# Patient Record
Sex: MALE | Race: WHITE | Employment: OTHER | URBAN - METROPOLITAN AREA
[De-identification: names, ages, dates, MRNs, and addresses within clinical notes are randomized per-mention and may not be internally consistent; named-entity substitution may affect disease eponyms.]

---

## 2020-01-01 ENCOUNTER — APPOINTMENT (OUTPATIENT)
Dept: SPEECH THERAPY | Facility: CLINIC | Age: 85
DRG: 065 | End: 2020-01-01
Payer: MEDICARE

## 2020-01-01 ENCOUNTER — HOSPITAL ENCOUNTER (EMERGENCY)
Facility: HOSPITAL | Age: 85
Discharge: HOME | End: 2020-11-05
Attending: PHYSICIAN ASSISTANT | Admitting: PHYSICIAN ASSISTANT
Payer: COMMERCIAL

## 2020-01-01 ENCOUNTER — APPOINTMENT (OUTPATIENT)
Dept: GENERAL RADIOLOGY | Facility: HOSPITAL | Age: 85
End: 2020-01-01
Attending: PHYSICIAN ASSISTANT
Payer: COMMERCIAL

## 2020-01-01 ENCOUNTER — HOSPITAL ENCOUNTER (EMERGENCY)
Facility: HOSPITAL | Age: 85
Discharge: ANOTHER HOSPITAL | End: 2020-01-07
Attending: INTERNAL MEDICINE | Admitting: INTERNAL MEDICINE
Payer: MEDICARE

## 2020-01-01 ENCOUNTER — APPOINTMENT (OUTPATIENT)
Dept: CARDIOLOGY | Facility: CLINIC | Age: 85
DRG: 065 | End: 2020-01-01
Attending: STUDENT IN AN ORGANIZED HEALTH CARE EDUCATION/TRAINING PROGRAM
Payer: MEDICARE

## 2020-01-01 ENCOUNTER — APPOINTMENT (OUTPATIENT)
Dept: CT IMAGING | Facility: HOSPITAL | Age: 85
End: 2020-01-01
Attending: INTERNAL MEDICINE
Payer: MEDICARE

## 2020-01-01 ENCOUNTER — APPOINTMENT (OUTPATIENT)
Dept: INTERVENTIONAL RADIOLOGY/VASCULAR | Facility: CLINIC | Age: 85
DRG: 065 | End: 2020-01-01
Payer: MEDICARE

## 2020-01-01 ENCOUNTER — RESULTS ONLY (OUTPATIENT)
Dept: LAB | Age: 85
End: 2020-01-01

## 2020-01-01 ENCOUNTER — APPOINTMENT (OUTPATIENT)
Dept: CT IMAGING | Facility: CLINIC | Age: 85
DRG: 065 | End: 2020-01-01
Payer: MEDICARE

## 2020-01-01 ENCOUNTER — APPOINTMENT (OUTPATIENT)
Dept: PHYSICAL THERAPY | Facility: CLINIC | Age: 85
DRG: 065 | End: 2020-01-01
Attending: STUDENT IN AN ORGANIZED HEALTH CARE EDUCATION/TRAINING PROGRAM
Payer: MEDICARE

## 2020-01-01 ENCOUNTER — APPOINTMENT (OUTPATIENT)
Dept: CT IMAGING | Facility: HOSPITAL | Age: 85
End: 2020-01-01
Attending: PHYSICIAN ASSISTANT
Payer: COMMERCIAL

## 2020-01-01 ENCOUNTER — APPOINTMENT (OUTPATIENT)
Dept: MRI IMAGING | Facility: CLINIC | Age: 85
DRG: 065 | End: 2020-01-01
Payer: MEDICARE

## 2020-01-01 ENCOUNTER — HOSPITAL ENCOUNTER (INPATIENT)
Facility: CLINIC | Age: 85
LOS: 3 days | Discharge: NURSING, REHAB OR LONG-TERM CARE | DRG: 065 | End: 2020-01-10
Attending: EMERGENCY MEDICINE | Admitting: PSYCHIATRY & NEUROLOGY
Payer: MEDICARE

## 2020-01-01 ENCOUNTER — APPOINTMENT (OUTPATIENT)
Dept: OCCUPATIONAL THERAPY | Facility: CLINIC | Age: 85
DRG: 065 | End: 2020-01-01
Attending: STUDENT IN AN ORGANIZED HEALTH CARE EDUCATION/TRAINING PROGRAM
Payer: MEDICARE

## 2020-01-01 ENCOUNTER — APPOINTMENT (OUTPATIENT)
Dept: SPEECH THERAPY | Facility: CLINIC | Age: 85
DRG: 065 | End: 2020-01-01
Attending: STUDENT IN AN ORGANIZED HEALTH CARE EDUCATION/TRAINING PROGRAM
Payer: MEDICARE

## 2020-01-01 VITALS
OXYGEN SATURATION: 96 % | RESPIRATION RATE: 16 BRPM | TEMPERATURE: 98 F | WEIGHT: 192.5 LBS | DIASTOLIC BLOOD PRESSURE: 76 MMHG | BODY MASS INDEX: 31.07 KG/M2 | SYSTOLIC BLOOD PRESSURE: 133 MMHG | HEART RATE: 85 BPM

## 2020-01-01 VITALS
TEMPERATURE: 98 F | OXYGEN SATURATION: 95 % | DIASTOLIC BLOOD PRESSURE: 93 MMHG | RESPIRATION RATE: 22 BRPM | HEART RATE: 83 BPM | SYSTOLIC BLOOD PRESSURE: 156 MMHG

## 2020-01-01 VITALS
HEART RATE: 79 BPM | SYSTOLIC BLOOD PRESSURE: 145 MMHG | RESPIRATION RATE: 16 BRPM | WEIGHT: 188.93 LBS | TEMPERATURE: 96.8 F | DIASTOLIC BLOOD PRESSURE: 88 MMHG | OXYGEN SATURATION: 95 % | BODY MASS INDEX: 30.49 KG/M2

## 2020-01-01 DIAGNOSIS — I63.9 CEREBROVASCULAR ACCIDENT (CVA), UNSPECIFIED MECHANISM (H): ICD-10-CM

## 2020-01-01 DIAGNOSIS — I63.231 CEREBROVASCULAR ACCIDENT (CVA) DUE TO OCCLUSION OF RIGHT CAROTID ARTERY (H): Primary | ICD-10-CM

## 2020-01-01 DIAGNOSIS — I10 ESSENTIAL HYPERTENSION: Chronic | ICD-10-CM

## 2020-01-01 DIAGNOSIS — I63.29: ICD-10-CM

## 2020-01-01 DIAGNOSIS — R91.8 MASS OF LOWER LOBE OF LUNG: ICD-10-CM

## 2020-01-01 LAB
ABO + RH BLD: NORMAL
ABO + RH BLD: NORMAL
ALBUMIN SERPL-MCNC: 2.6 G/DL (ref 3.4–5)
ALBUMIN SERPL-MCNC: 3.3 G/DL (ref 3.4–5)
ALBUMIN UR-MCNC: 30 MG/DL
ALP SERPL-CCNC: 72 U/L (ref 40–150)
ALP SERPL-CCNC: 84 U/L (ref 40–150)
ALT SERPL W P-5'-P-CCNC: 19 U/L (ref 0–70)
ALT SERPL W P-5'-P-CCNC: 34 U/L (ref 0–70)
ANION GAP SERPL CALCULATED.3IONS-SCNC: 4 MMOL/L (ref 3–14)
ANION GAP SERPL CALCULATED.3IONS-SCNC: 5 MMOL/L (ref 3–14)
ANION GAP SERPL CALCULATED.3IONS-SCNC: 5 MMOL/L (ref 3–14)
ANION GAP SERPL CALCULATED.3IONS-SCNC: 6 MMOL/L (ref 3–14)
ANION GAP SERPL CALCULATED.3IONS-SCNC: 6 MMOL/L (ref 3–14)
APPEARANCE UR: ABNORMAL
APTT PPP: 35 SEC (ref 22–37)
AST SERPL W P-5'-P-CCNC: 17 U/L (ref 0–45)
AST SERPL W P-5'-P-CCNC: 19 U/L (ref 0–45)
BACTERIA #/AREA URNS HPF: ABNORMAL /HPF
BASOPHILS # BLD AUTO: 0 10E9/L (ref 0–0.2)
BASOPHILS # BLD AUTO: 0 10E9/L (ref 0–0.2)
BASOPHILS # BLD AUTO: 0.1 10E9/L (ref 0–0.2)
BASOPHILS NFR BLD AUTO: 0.2 %
BASOPHILS NFR BLD AUTO: 0.4 %
BASOPHILS NFR BLD AUTO: 0.6 %
BILIRUB SERPL-MCNC: 0.5 MG/DL (ref 0.2–1.3)
BILIRUB SERPL-MCNC: 0.8 MG/DL (ref 0.2–1.3)
BILIRUB UR QL STRIP: NEGATIVE
BLD GP AB SCN SERPL QL: NORMAL
BLOOD BANK CMNT PATIENT-IMP: NORMAL
BUN SERPL-MCNC: 21 MG/DL (ref 7–30)
BUN SERPL-MCNC: 24 MG/DL (ref 7–30)
BUN SERPL-MCNC: 25 MG/DL (ref 7–30)
BUN SERPL-MCNC: 26 MG/DL (ref 7–30)
BUN SERPL-MCNC: 30 MG/DL (ref 7–30)
CALCIUM SERPL-MCNC: 8.4 MG/DL (ref 8.5–10.1)
CALCIUM SERPL-MCNC: 8.4 MG/DL (ref 8.5–10.1)
CALCIUM SERPL-MCNC: 8.7 MG/DL (ref 8.5–10.1)
CALCIUM SERPL-MCNC: 8.8 MG/DL (ref 8.5–10.1)
CALCIUM SERPL-MCNC: 8.8 MG/DL (ref 8.5–10.1)
CHLORIDE SERPL-SCNC: 107 MMOL/L (ref 94–109)
CHLORIDE SERPL-SCNC: 107 MMOL/L (ref 94–109)
CHLORIDE SERPL-SCNC: 110 MMOL/L (ref 94–109)
CHLORIDE SERPL-SCNC: 112 MMOL/L (ref 94–109)
CHLORIDE SERPL-SCNC: 112 MMOL/L (ref 94–109)
CHOLEST SERPL-MCNC: 71 MG/DL
CO2 SERPL-SCNC: 24 MMOL/L (ref 20–32)
CO2 SERPL-SCNC: 24 MMOL/L (ref 20–32)
CO2 SERPL-SCNC: 26 MMOL/L (ref 20–32)
CO2 SERPL-SCNC: 29 MMOL/L (ref 20–32)
CO2 SERPL-SCNC: 30 MMOL/L (ref 20–32)
COLOR UR AUTO: YELLOW
CREAT BLD-MCNC: 1.5 MG/DL (ref 0.66–1.25)
CREAT SERPL-MCNC: 1.39 MG/DL (ref 0.66–1.25)
CREAT SERPL-MCNC: 1.44 MG/DL (ref 0.66–1.25)
CREAT SERPL-MCNC: 1.47 MG/DL (ref 0.66–1.25)
CREAT SERPL-MCNC: 1.51 MG/DL (ref 0.66–1.25)
CREAT SERPL-MCNC: 1.66 MG/DL (ref 0.66–1.25)
CRP SERPL-MCNC: 3.6 MG/L (ref 0–8)
DIFFERENTIAL METHOD BLD: ABNORMAL
DIFFERENTIAL METHOD BLD: ABNORMAL
DIFFERENTIAL METHOD BLD: NORMAL
EOSINOPHIL # BLD AUTO: 0.1 10E9/L (ref 0–0.7)
EOSINOPHIL # BLD AUTO: 0.4 10E9/L (ref 0–0.7)
EOSINOPHIL # BLD AUTO: 0.6 10E9/L (ref 0–0.7)
EOSINOPHIL NFR BLD AUTO: 0.9 %
EOSINOPHIL NFR BLD AUTO: 4.6 %
EOSINOPHIL NFR BLD AUTO: 7.3 %
ERYTHROCYTE [DISTWIDTH] IN BLOOD BY AUTOMATED COUNT: 14 % (ref 10–15)
ERYTHROCYTE [DISTWIDTH] IN BLOOD BY AUTOMATED COUNT: 15.7 % (ref 10–15)
ETHANOL SERPL-MCNC: <0.01 G/DL
GFR SERPL CREATININE-BSD FRML MDRD: 37 ML/MIN/{1.73_M2}
GFR SERPL CREATININE-BSD FRML MDRD: 42 ML/MIN/{1.73_M2}
GFR SERPL CREATININE-BSD FRML MDRD: 43 ML/MIN/{1.73_M2}
GFR SERPL CREATININE-BSD FRML MDRD: 44 ML/MIN/{1.73_M2}
GFR SERPL CREATININE-BSD FRML MDRD: 45 ML/MIN/{1.73_M2}
GFR SERPL CREATININE-BSD FRML MDRD: 46 ML/MIN/{1.73_M2}
GLUCOSE BLDC GLUCOMTR-MCNC: 100 MG/DL (ref 70–99)
GLUCOSE BLDC GLUCOMTR-MCNC: 111 MG/DL (ref 70–99)
GLUCOSE BLDC GLUCOMTR-MCNC: 122 MG/DL (ref 70–99)
GLUCOSE BLDC GLUCOMTR-MCNC: 124 MG/DL (ref 70–99)
GLUCOSE BLDC GLUCOMTR-MCNC: 129 MG/DL (ref 70–99)
GLUCOSE BLDC GLUCOMTR-MCNC: 130 MG/DL (ref 70–99)
GLUCOSE BLDC GLUCOMTR-MCNC: 91 MG/DL (ref 70–99)
GLUCOSE BLDC GLUCOMTR-MCNC: 94 MG/DL (ref 70–99)
GLUCOSE BLDC GLUCOMTR-MCNC: 95 MG/DL (ref 70–99)
GLUCOSE SERPL-MCNC: 110 MG/DL (ref 70–99)
GLUCOSE SERPL-MCNC: 117 MG/DL (ref 70–99)
GLUCOSE SERPL-MCNC: 125 MG/DL (ref 70–99)
GLUCOSE SERPL-MCNC: 143 MG/DL (ref 70–99)
GLUCOSE SERPL-MCNC: 96 MG/DL (ref 70–99)
GLUCOSE UR STRIP-MCNC: NEGATIVE MG/DL
HBA1C MFR BLD: 5.7 % (ref 0–5.6)
HCT VFR BLD AUTO: 40.4 % (ref 40–53)
HCT VFR BLD AUTO: 41.1 % (ref 40–53)
HCT VFR BLD AUTO: 42.9 % (ref 40–53)
HCT VFR BLD AUTO: 45.5 % (ref 40–53)
HDLC SERPL-MCNC: 28 MG/DL
HGB BLD-MCNC: 13.1 G/DL (ref 13.3–17.7)
HGB BLD-MCNC: 13.6 G/DL (ref 13.3–17.7)
HGB BLD-MCNC: 14.5 G/DL (ref 13.3–17.7)
HGB BLD-MCNC: 14.8 G/DL (ref 13.3–17.7)
HGB UR QL STRIP: ABNORMAL
IMM GRANULOCYTES # BLD: 0 10E9/L (ref 0–0.4)
IMM GRANULOCYTES # BLD: 0.1 10E9/L (ref 0–0.4)
IMM GRANULOCYTES # BLD: 0.1 10E9/L (ref 0–0.4)
IMM GRANULOCYTES NFR BLD: 0.4 %
IMM GRANULOCYTES NFR BLD: 0.4 %
IMM GRANULOCYTES NFR BLD: 0.6 %
INR PPP: 1.05 (ref 0.86–1.14)
INR PPP: 1.1 (ref 0.86–1.14)
INTERPRETATION ECG - MUSE: NORMAL
KETONES UR STRIP-MCNC: NEGATIVE MG/DL
LACTATE BLD-SCNC: 1.7 MMOL/L (ref 0.7–2)
LDLC SERPL CALC-MCNC: 27 MG/DL
LEUKOCYTE ESTERASE UR QL STRIP: ABNORMAL
LYMPHOCYTES # BLD AUTO: 0.8 10E9/L (ref 0.8–5.3)
LYMPHOCYTES # BLD AUTO: 1.1 10E9/L (ref 0.8–5.3)
LYMPHOCYTES # BLD AUTO: 1.2 10E9/L (ref 0.8–5.3)
LYMPHOCYTES NFR BLD AUTO: 13.8 %
LYMPHOCYTES NFR BLD AUTO: 14 %
LYMPHOCYTES NFR BLD AUTO: 6.7 %
MAGNESIUM SERPL-MCNC: 2 MG/DL (ref 1.6–2.3)
MAGNESIUM SERPL-MCNC: 2.1 MG/DL (ref 1.6–2.3)
MCH RBC QN AUTO: 29.2 PG (ref 26.5–33)
MCH RBC QN AUTO: 30.5 PG (ref 26.5–33)
MCH RBC QN AUTO: 30.9 PG (ref 26.5–33)
MCH RBC QN AUTO: 31 PG (ref 26.5–33)
MCHC RBC AUTO-ENTMCNC: 32.4 G/DL (ref 31.5–36.5)
MCHC RBC AUTO-ENTMCNC: 32.5 G/DL (ref 31.5–36.5)
MCHC RBC AUTO-ENTMCNC: 33.1 G/DL (ref 31.5–36.5)
MCHC RBC AUTO-ENTMCNC: 33.8 G/DL (ref 31.5–36.5)
MCV RBC AUTO: 90 FL (ref 78–100)
MCV RBC AUTO: 92 FL (ref 78–100)
MCV RBC AUTO: 94 FL (ref 78–100)
MCV RBC AUTO: 94 FL (ref 78–100)
MONOCYTES # BLD AUTO: 0.6 10E9/L (ref 0–1.3)
MONOCYTES # BLD AUTO: 0.7 10E9/L (ref 0–1.3)
MONOCYTES # BLD AUTO: 0.7 10E9/L (ref 0–1.3)
MONOCYTES NFR BLD AUTO: 5.3 %
MONOCYTES NFR BLD AUTO: 7.8 %
MONOCYTES NFR BLD AUTO: 8 %
MRSA DNA SPEC QL NAA+PROBE: NEGATIVE
NEUTROPHILS # BLD AUTO: 10.9 10E9/L (ref 1.6–8.3)
NEUTROPHILS # BLD AUTO: 5.8 10E9/L (ref 1.6–8.3)
NEUTROPHILS # BLD AUTO: 6 10E9/L (ref 1.6–8.3)
NEUTROPHILS NFR BLD AUTO: 70.1 %
NEUTROPHILS NFR BLD AUTO: 72.4 %
NEUTROPHILS NFR BLD AUTO: 86.5 %
NITRATE UR QL: NEGATIVE
NONHDLC SERPL-MCNC: 43 MG/DL
NRBC # BLD AUTO: 0 10*3/UL
NRBC BLD AUTO-RTO: 0 /100
PH UR STRIP: 6 PH (ref 4.7–8)
PHOSPHATE SERPL-MCNC: 2.1 MG/DL (ref 2.5–4.5)
PHOSPHATE SERPL-MCNC: 3 MG/DL (ref 2.5–4.5)
PLATELET # BLD AUTO: 193 10E9/L (ref 150–450)
PLATELET # BLD AUTO: 197 10E9/L (ref 150–450)
PLATELET # BLD AUTO: 208 10E9/L (ref 150–450)
PLATELET # BLD AUTO: 213 10E9/L (ref 150–450)
PLATELET # BLD AUTO: 221 10E9/L (ref 150–450)
PLATELET # BLD AUTO: 250 10E9/L (ref 150–450)
POTASSIUM SERPL-SCNC: 4.1 MMOL/L (ref 3.4–5.3)
POTASSIUM SERPL-SCNC: 4.2 MMOL/L (ref 3.4–5.3)
POTASSIUM SERPL-SCNC: 4.2 MMOL/L (ref 3.4–5.3)
POTASSIUM SERPL-SCNC: 4.3 MMOL/L (ref 3.4–5.3)
POTASSIUM SERPL-SCNC: 4.4 MMOL/L (ref 3.4–5.3)
PROT SERPL-MCNC: 6.5 G/DL (ref 6.8–8.8)
PROT SERPL-MCNC: 6.8 G/DL (ref 6.8–8.8)
RBC # BLD AUTO: 4.39 10E12/L (ref 4.4–5.9)
RBC # BLD AUTO: 4.49 10E12/L (ref 4.4–5.9)
RBC # BLD AUTO: 4.69 10E12/L (ref 4.4–5.9)
RBC # BLD AUTO: 4.85 10E12/L (ref 4.4–5.9)
RBC #/AREA URNS AUTO: 36 /HPF (ref 0–2)
SODIUM SERPL-SCNC: 140 MMOL/L (ref 133–144)
SODIUM SERPL-SCNC: 142 MMOL/L (ref 133–144)
SODIUM SERPL-SCNC: 143 MMOL/L (ref 133–144)
SOURCE: ABNORMAL
SP GR UR STRIP: 1.02 (ref 1–1.03)
SPECIMEN EXP DATE BLD: NORMAL
SPECIMEN SOURCE: NORMAL
TRIGL SERPL-MCNC: 80 MG/DL
TROPONIN I SERPL-MCNC: <0.015 UG/L (ref 0–0.04)
TROPONIN I SERPL-MCNC: <0.015 UG/L (ref 0–0.04)
UROBILINOGEN UR STRIP-MCNC: NORMAL MG/DL (ref 0–2)
WBC # BLD AUTO: 10.1 10E9/L (ref 4–11)
WBC # BLD AUTO: 12.8 10E9/L (ref 4–11)
WBC # BLD AUTO: 8.2 10E9/L (ref 4–11)
WBC # BLD AUTO: 8.3 10E9/L (ref 4–11)
WBC #/AREA URNS AUTO: >182 /HPF (ref 0–5)
WBC CLUMPS #/AREA URNS HPF: PRESENT /HPF

## 2020-01-01 PROCEDURE — 85049 AUTOMATED PLATELET COUNT: CPT | Performed by: STUDENT IN AN ORGANIZED HEALTH CARE EDUCATION/TRAINING PROGRAM

## 2020-01-01 PROCEDURE — 25000132 ZZH RX MED GY IP 250 OP 250 PS 637: Performed by: STUDENT IN AN ORGANIZED HEALTH CARE EDUCATION/TRAINING PROGRAM

## 2020-01-01 PROCEDURE — 25000128 H RX IP 250 OP 636: Performed by: STUDENT IN AN ORGANIZED HEALTH CARE EDUCATION/TRAINING PROGRAM

## 2020-01-01 PROCEDURE — 25000125 ZZHC RX 250: Performed by: STUDENT IN AN ORGANIZED HEALTH CARE EDUCATION/TRAINING PROGRAM

## 2020-01-01 PROCEDURE — 36415 COLL VENOUS BLD VENIPUNCTURE: CPT | Performed by: INTERNAL MEDICINE

## 2020-01-01 PROCEDURE — 99285 EMERGENCY DEPT VISIT HI MDM: CPT | Mod: 25

## 2020-01-01 PROCEDURE — 84100 ASSAY OF PHOSPHORUS: CPT | Performed by: EMERGENCY MEDICINE

## 2020-01-01 PROCEDURE — 97535 SELF CARE MNGMENT TRAINING: CPT | Mod: GO | Performed by: OCCUPATIONAL THERAPIST

## 2020-01-01 PROCEDURE — 0042T CT HEAD PERFUSION WITH CONTRAST: CPT

## 2020-01-01 PROCEDURE — 70498 CT ANGIOGRAPHY NECK: CPT | Mod: TC

## 2020-01-01 PROCEDURE — 84100 ASSAY OF PHOSPHORUS: CPT | Performed by: STUDENT IN AN ORGANIZED HEALTH CARE EDUCATION/TRAINING PROGRAM

## 2020-01-01 PROCEDURE — 250N000013 HC RX MED GY IP 250 OP 250 PS 637: Performed by: PHYSICIAN ASSISTANT

## 2020-01-01 PROCEDURE — 85730 THROMBOPLASTIN TIME PARTIAL: CPT | Performed by: EMERGENCY MEDICINE

## 2020-01-01 PROCEDURE — 87640 STAPH A DNA AMP PROBE: CPT | Performed by: STUDENT IN AN ORGANIZED HEALTH CARE EDUCATION/TRAINING PROGRAM

## 2020-01-01 PROCEDURE — 84484 ASSAY OF TROPONIN QUANT: CPT | Performed by: EMERGENCY MEDICINE

## 2020-01-01 PROCEDURE — 00000146 ZZHCL STATISTIC GLUCOSE BY METER IP

## 2020-01-01 PROCEDURE — 80061 LIPID PANEL: CPT | Performed by: STUDENT IN AN ORGANIZED HEALTH CARE EDUCATION/TRAINING PROGRAM

## 2020-01-01 PROCEDURE — B3131ZZ FLUOROSCOPY OF RIGHT COMMON CAROTID ARTERY USING LOW OSMOLAR CONTRAST: ICD-10-PCS | Performed by: NEUROLOGICAL SURGERY

## 2020-01-01 PROCEDURE — 99291 CRITICAL CARE FIRST HOUR: CPT | Mod: Z6 | Performed by: EMERGENCY MEDICINE

## 2020-01-01 PROCEDURE — 97167 OT EVAL HIGH COMPLEX 60 MIN: CPT | Mod: GO | Performed by: OCCUPATIONAL THERAPIST

## 2020-01-01 PROCEDURE — 86140 C-REACTIVE PROTEIN: CPT | Performed by: INTERNAL MEDICINE

## 2020-01-01 PROCEDURE — 70450 CT HEAD/BRAIN W/O DYE: CPT | Mod: TC

## 2020-01-01 PROCEDURE — 25000132 ZZH RX MED GY IP 250 OP 250 PS 637: Performed by: PSYCHIATRY & NEUROLOGY

## 2020-01-01 PROCEDURE — 85610 PROTHROMBIN TIME: CPT | Performed by: EMERGENCY MEDICINE

## 2020-01-01 PROCEDURE — A9585 GADOBUTROL INJECTION: HCPCS | Performed by: PSYCHIATRY & NEUROLOGY

## 2020-01-01 PROCEDURE — 258N000003 HC RX IP 258 OP 636: Performed by: PHYSICIAN ASSISTANT

## 2020-01-01 PROCEDURE — 25000125 ZZHC RX 250: Performed by: PSYCHIATRY & NEUROLOGY

## 2020-01-01 PROCEDURE — 99153 MOD SED SAME PHYS/QHP EA: CPT

## 2020-01-01 PROCEDURE — 25000128 H RX IP 250 OP 636: Performed by: INTERNAL MEDICINE

## 2020-01-01 PROCEDURE — 80048 BASIC METABOLIC PNL TOTAL CA: CPT | Performed by: EMERGENCY MEDICINE

## 2020-01-01 PROCEDURE — 83735 ASSAY OF MAGNESIUM: CPT | Performed by: STUDENT IN AN ORGANIZED HEALTH CARE EDUCATION/TRAINING PROGRAM

## 2020-01-01 PROCEDURE — 25500064 ZZH RX 255 OP 636: Performed by: PSYCHIATRY & NEUROLOGY

## 2020-01-01 PROCEDURE — 40000275 ZZH STATISTIC RCP TIME EA 10 MIN

## 2020-01-01 PROCEDURE — 36224 PLACE CATH CAROTD ART: CPT | Mod: 50

## 2020-01-01 PROCEDURE — 87641 MR-STAPH DNA AMP PROBE: CPT | Performed by: STUDENT IN AN ORGANIZED HEALTH CARE EDUCATION/TRAINING PROGRAM

## 2020-01-01 PROCEDURE — 25000128 H RX IP 250 OP 636: Performed by: PSYCHIATRY & NEUROLOGY

## 2020-01-01 PROCEDURE — 86901 BLOOD TYPING SEROLOGIC RH(D): CPT | Performed by: STUDENT IN AN ORGANIZED HEALTH CARE EDUCATION/TRAINING PROGRAM

## 2020-01-01 PROCEDURE — 94640 AIRWAY INHALATION TREATMENT: CPT | Mod: 76

## 2020-01-01 PROCEDURE — 86900 BLOOD TYPING SEROLOGIC ABO: CPT | Performed by: STUDENT IN AN ORGANIZED HEALTH CARE EDUCATION/TRAINING PROGRAM

## 2020-01-01 PROCEDURE — 25800025 ZZH RX 258: Performed by: PSYCHIATRY & NEUROLOGY

## 2020-01-01 PROCEDURE — 93010 ELECTROCARDIOGRAM REPORT: CPT | Performed by: INTERNAL MEDICINE

## 2020-01-01 PROCEDURE — 85610 PROTHROMBIN TIME: CPT | Performed by: INTERNAL MEDICINE

## 2020-01-01 PROCEDURE — 80320 DRUG SCREEN QUANTALCOHOLS: CPT | Mod: GZ | Performed by: INTERNAL MEDICINE

## 2020-01-01 PROCEDURE — 92610 EVALUATE SWALLOWING FUNCTION: CPT | Mod: GN

## 2020-01-01 PROCEDURE — 25800030 ZZH RX IP 258 OP 636: Performed by: STUDENT IN AN ORGANIZED HEALTH CARE EDUCATION/TRAINING PROGRAM

## 2020-01-01 PROCEDURE — 96365 THER/PROPH/DIAG IV INF INIT: CPT | Mod: XU

## 2020-01-01 PROCEDURE — 99291 CRITICAL CARE FIRST HOUR: CPT | Mod: 25

## 2020-01-01 PROCEDURE — 80053 COMPREHEN METABOLIC PANEL: CPT | Performed by: INTERNAL MEDICINE

## 2020-01-01 PROCEDURE — 36415 COLL VENOUS BLD VENIPUNCTURE: CPT | Performed by: STUDENT IN AN ORGANIZED HEALTH CARE EDUCATION/TRAINING PROGRAM

## 2020-01-01 PROCEDURE — 25500064 ZZH RX 255 OP 636: Performed by: STUDENT IN AN ORGANIZED HEALTH CARE EDUCATION/TRAINING PROGRAM

## 2020-01-01 PROCEDURE — 27210889 ZZH ACCESSORY CR8

## 2020-01-01 PROCEDURE — 71045 X-RAY EXAM CHEST 1 VIEW: CPT

## 2020-01-01 PROCEDURE — 83036 HEMOGLOBIN GLYCOSYLATED A1C: CPT | Performed by: STUDENT IN AN ORGANIZED HEALTH CARE EDUCATION/TRAINING PROGRAM

## 2020-01-01 PROCEDURE — 255N000002 HC RX 255 OP 636: Performed by: RADIOLOGY

## 2020-01-01 PROCEDURE — 12000001 ZZH R&B MED SURG/OB UMMC

## 2020-01-01 PROCEDURE — 71260 CT THORAX DX C+: CPT

## 2020-01-01 PROCEDURE — 83735 ASSAY OF MAGNESIUM: CPT | Performed by: EMERGENCY MEDICINE

## 2020-01-01 PROCEDURE — 93005 ELECTROCARDIOGRAM TRACING: CPT

## 2020-01-01 PROCEDURE — B3161ZZ FLUOROSCOPY OF RIGHT INTERNAL CAROTID ARTERY USING LOW OSMOLAR CONTRAST: ICD-10-PCS | Performed by: NEUROLOGICAL SURGERY

## 2020-01-01 PROCEDURE — 85025 COMPLETE CBC W/AUTO DIFF WBC: CPT | Performed by: PHYSICIAN ASSISTANT

## 2020-01-01 PROCEDURE — 40000264 ECHOCARDIOGRAM COMPLETE

## 2020-01-01 PROCEDURE — 86850 RBC ANTIBODY SCREEN: CPT | Performed by: STUDENT IN AN ORGANIZED HEALTH CARE EDUCATION/TRAINING PROGRAM

## 2020-01-01 PROCEDURE — 250N000011 HC RX IP 250 OP 636: Performed by: PHYSICIAN ASSISTANT

## 2020-01-01 PROCEDURE — 27211044 ZZH SHEATH CR9

## 2020-01-01 PROCEDURE — 85025 COMPLETE CBC W/AUTO DIFF WBC: CPT | Performed by: INTERNAL MEDICINE

## 2020-01-01 PROCEDURE — 85027 COMPLETE CBC AUTOMATED: CPT | Performed by: EMERGENCY MEDICINE

## 2020-01-01 PROCEDURE — B31B1ZZ FLUOROSCOPY OF LEFT EXTERNAL CAROTID ARTERY USING LOW OSMOLAR CONTRAST: ICD-10-PCS | Performed by: NEUROLOGICAL SURGERY

## 2020-01-01 PROCEDURE — 27211081 ZZH CATH CR10

## 2020-01-01 PROCEDURE — 92526 ORAL FUNCTION THERAPY: CPT | Mod: GN

## 2020-01-01 PROCEDURE — 99285 EMERGENCY DEPT VISIT HI MDM: CPT | Mod: 25 | Performed by: EMERGENCY MEDICINE

## 2020-01-01 PROCEDURE — 83605 ASSAY OF LACTIC ACID: CPT | Performed by: PHYSICIAN ASSISTANT

## 2020-01-01 PROCEDURE — 85027 COMPLETE CBC AUTOMATED: CPT | Performed by: STUDENT IN AN ORGANIZED HEALTH CARE EDUCATION/TRAINING PROGRAM

## 2020-01-01 PROCEDURE — 25000128 H RX IP 250 OP 636: Performed by: EMERGENCY MEDICINE

## 2020-01-01 PROCEDURE — 27210908 ZZH NEEDLE CR4

## 2020-01-01 PROCEDURE — 27210738 ZZH ACCESSORY CR2

## 2020-01-01 PROCEDURE — 40000739 ZZH STATISTIC STROKE CODE W/O ACCESS

## 2020-01-01 PROCEDURE — 84484 ASSAY OF TROPONIN QUANT: CPT | Performed by: INTERNAL MEDICINE

## 2020-01-01 PROCEDURE — 80053 COMPREHEN METABOLIC PANEL: CPT | Performed by: PHYSICIAN ASSISTANT

## 2020-01-01 PROCEDURE — 97162 PT EVAL MOD COMPLEX 30 MIN: CPT | Mod: GP

## 2020-01-01 PROCEDURE — 99285 EMERGENCY DEPT VISIT HI MDM: CPT | Mod: Z6 | Performed by: INTERNAL MEDICINE

## 2020-01-01 PROCEDURE — C1769 GUIDE WIRE: HCPCS

## 2020-01-01 PROCEDURE — 70553 MRI BRAIN STEM W/O & W/DYE: CPT

## 2020-01-01 PROCEDURE — 94640 AIRWAY INHALATION TREATMENT: CPT

## 2020-01-01 PROCEDURE — 70450 CT HEAD/BRAIN W/O DYE: CPT

## 2020-01-01 PROCEDURE — 97530 THERAPEUTIC ACTIVITIES: CPT | Mod: GP

## 2020-01-01 PROCEDURE — 27210742 ZZH CATH CR1

## 2020-01-01 PROCEDURE — 20000004 ZZH R&B ICU UMMC

## 2020-01-01 PROCEDURE — 99152 MOD SED SAME PHYS/QHP 5/>YRS: CPT

## 2020-01-01 PROCEDURE — 80048 BASIC METABOLIC PNL TOTAL CA: CPT | Performed by: STUDENT IN AN ORGANIZED HEALTH CARE EDUCATION/TRAINING PROGRAM

## 2020-01-01 PROCEDURE — 82565 ASSAY OF CREATININE: CPT

## 2020-01-01 PROCEDURE — 99284 EMERGENCY DEPT VISIT MOD MDM: CPT | Performed by: PHYSICIAN ASSISTANT

## 2020-01-01 PROCEDURE — C1887 CATHETER, GUIDING: HCPCS

## 2020-01-01 PROCEDURE — 27210804 ZZH SHEATH CR3

## 2020-01-01 PROCEDURE — 85004 AUTOMATED DIFF WBC COUNT: CPT | Performed by: EMERGENCY MEDICINE

## 2020-01-01 PROCEDURE — 27210888 ZZH ACCESSORY CR7

## 2020-01-01 PROCEDURE — 70496 CT ANGIOGRAPHY HEAD: CPT

## 2020-01-01 PROCEDURE — 36226 PLACE CATH VERTEBRAL ART: CPT

## 2020-01-01 PROCEDURE — 93306 TTE W/DOPPLER COMPLETE: CPT | Mod: 26 | Performed by: INTERNAL MEDICINE

## 2020-01-01 PROCEDURE — 97112 NEUROMUSCULAR REEDUCATION: CPT | Mod: GP

## 2020-01-01 RX ORDER — IODIXANOL 320 MG/ML
150 INJECTION, SOLUTION INTRAVASCULAR ONCE
Status: COMPLETED | OUTPATIENT
Start: 2020-01-01 | End: 2020-01-01

## 2020-01-01 RX ORDER — AMLODIPINE BESYLATE 10 MG/1
10 TABLET ORAL DAILY
DISCHARGE
Start: 2020-01-01

## 2020-01-01 RX ORDER — FLUMAZENIL 0.1 MG/ML
0.2 INJECTION, SOLUTION INTRAVENOUS
Status: DISCONTINUED | OUTPATIENT
Start: 2020-01-01 | End: 2020-01-01 | Stop reason: HOSPADM

## 2020-01-01 RX ORDER — TIOTROPIUM BROMIDE 18 UG/1
18 CAPSULE ORAL; RESPIRATORY (INHALATION) DAILY
Status: DISCONTINUED | OUTPATIENT
Start: 2020-01-01 | End: 2020-01-01

## 2020-01-01 RX ORDER — PROCHLORPERAZINE 25 MG
12.5 SUPPOSITORY, RECTAL RECTAL EVERY 12 HOURS PRN
Status: DISCONTINUED | OUTPATIENT
Start: 2020-01-01 | End: 2020-01-01 | Stop reason: HOSPADM

## 2020-01-01 RX ORDER — CLOPIDOGREL BISULFATE 75 MG/1
75 TABLET ORAL DAILY
Qty: 90 TABLET | Refills: 0 | DISCHARGE
Start: 2020-01-01

## 2020-01-01 RX ORDER — AMLODIPINE BESYLATE 5 MG/1
5 TABLET ORAL ONCE
Status: DISCONTINUED | OUTPATIENT
Start: 2020-01-01 | End: 2020-01-01

## 2020-01-01 RX ORDER — ACYCLOVIR 200 MG/1
10 CAPSULE ORAL ONCE
Status: COMPLETED | OUTPATIENT
Start: 2020-01-01 | End: 2020-01-01

## 2020-01-01 RX ORDER — ALBUTEROL SULFATE 90 UG/1
2 AEROSOL, METERED RESPIRATORY (INHALATION) EVERY 4 HOURS PRN
Status: DISCONTINUED | OUTPATIENT
Start: 2020-01-01 | End: 2020-01-01 | Stop reason: HOSPADM

## 2020-01-01 RX ORDER — AMLODIPINE BESYLATE 5 MG/1
5 TABLET ORAL DAILY
Status: DISCONTINUED | OUTPATIENT
Start: 2020-01-01 | End: 2020-01-01

## 2020-01-01 RX ORDER — ALBUTEROL SULFATE 0.83 MG/ML
2.5 SOLUTION RESPIRATORY (INHALATION) 4 TIMES DAILY
Status: DISCONTINUED | OUTPATIENT
Start: 2020-01-01 | End: 2020-01-01 | Stop reason: HOSPADM

## 2020-01-01 RX ORDER — PRAVASTATIN SODIUM 10 MG
10 TABLET ORAL DAILY
DISCHARGE
Start: 2020-01-01

## 2020-01-01 RX ORDER — HEPARIN SODIUM 5000 [USP'U]/.5ML
5000 INJECTION, SOLUTION INTRAVENOUS; SUBCUTANEOUS EVERY 8 HOURS
Status: DISCONTINUED | OUTPATIENT
Start: 2020-01-01 | End: 2020-01-01 | Stop reason: HOSPADM

## 2020-01-01 RX ORDER — POTASSIUM CL/LIDO/0.9 % NACL 10MEQ/0.1L
10 INTRAVENOUS SOLUTION, PIGGYBACK (ML) INTRAVENOUS
Status: DISCONTINUED | OUTPATIENT
Start: 2020-01-01 | End: 2020-01-01 | Stop reason: HOSPADM

## 2020-01-01 RX ORDER — IOPAMIDOL 612 MG/ML
100 INJECTION, SOLUTION INTRAVASCULAR ONCE
Status: COMPLETED | OUTPATIENT
Start: 2020-01-01 | End: 2020-01-01

## 2020-01-01 RX ORDER — METOPROLOL TARTRATE 25 MG/1
12.5 TABLET, FILM COATED ORAL 2 TIMES DAILY
DISCHARGE
Start: 2020-01-01

## 2020-01-01 RX ORDER — NICOTINE 21 MG/24HR
1 PATCH, TRANSDERMAL 24 HOURS TRANSDERMAL DAILY
Status: DISCONTINUED | OUTPATIENT
Start: 2020-01-01 | End: 2020-01-01 | Stop reason: HOSPADM

## 2020-01-01 RX ORDER — PRAVASTATIN SODIUM 10 MG
10 TABLET ORAL DAILY
Status: DISCONTINUED | OUTPATIENT
Start: 2020-01-01 | End: 2020-01-01 | Stop reason: HOSPADM

## 2020-01-01 RX ORDER — ONDANSETRON 4 MG/1
4 TABLET, ORALLY DISINTEGRATING ORAL EVERY 6 HOURS PRN
Status: DISCONTINUED | OUTPATIENT
Start: 2020-01-01 | End: 2020-01-01 | Stop reason: HOSPADM

## 2020-01-01 RX ORDER — LABETALOL 20 MG/4 ML (5 MG/ML) INTRAVENOUS SYRINGE
10 EVERY 10 MIN PRN
Status: DISCONTINUED | OUTPATIENT
Start: 2020-01-01 | End: 2020-01-01

## 2020-01-01 RX ORDER — LABETALOL 20 MG/4 ML (5 MG/ML) INTRAVENOUS SYRINGE
10 EVERY 10 MIN PRN
Status: DISCONTINUED | OUTPATIENT
Start: 2020-01-01 | End: 2020-01-01 | Stop reason: HOSPADM

## 2020-01-01 RX ORDER — CLOPIDOGREL BISULFATE 75 MG/1
75 TABLET ORAL DAILY
Status: DISCONTINUED | OUTPATIENT
Start: 2020-01-01 | End: 2020-01-01

## 2020-01-01 RX ORDER — HALOPERIDOL 5 MG/ML
INJECTION INTRAMUSCULAR
Status: DISCONTINUED
Start: 2020-01-01 | End: 2020-01-01 | Stop reason: WASHOUT

## 2020-01-01 RX ORDER — NALOXONE HYDROCHLORIDE 0.4 MG/ML
.1-.4 INJECTION, SOLUTION INTRAMUSCULAR; INTRAVENOUS; SUBCUTANEOUS
Status: DISCONTINUED | OUTPATIENT
Start: 2020-01-01 | End: 2020-01-01 | Stop reason: HOSPADM

## 2020-01-01 RX ORDER — POLYETHYLENE GLYCOL 3350 17 G/17G
17 POWDER, FOR SOLUTION ORAL DAILY PRN
Status: DISCONTINUED | OUTPATIENT
Start: 2020-01-01 | End: 2020-01-01 | Stop reason: HOSPADM

## 2020-01-01 RX ORDER — ASPIRIN 325 MG
325 TABLET ORAL DAILY
Status: DISCONTINUED | OUTPATIENT
Start: 2020-01-01 | End: 2020-01-01

## 2020-01-01 RX ORDER — HALOPERIDOL 5 MG/ML
5 INJECTION INTRAMUSCULAR ONCE
Status: DISCONTINUED | OUTPATIENT
Start: 2020-01-01 | End: 2020-01-01

## 2020-01-01 RX ORDER — GADOBUTROL 604.72 MG/ML
10 INJECTION INTRAVENOUS ONCE
Status: COMPLETED | OUTPATIENT
Start: 2020-01-01 | End: 2020-01-01

## 2020-01-01 RX ORDER — HYDRALAZINE HYDROCHLORIDE 20 MG/ML
10 INJECTION INTRAMUSCULAR; INTRAVENOUS EVERY 6 HOURS PRN
Status: DISCONTINUED | OUTPATIENT
Start: 2020-01-01 | End: 2020-01-01 | Stop reason: HOSPADM

## 2020-01-01 RX ORDER — ACETAMINOPHEN 325 MG/1
650 TABLET ORAL
COMMUNITY
Start: 2020-01-01

## 2020-01-01 RX ORDER — CLOPIDOGREL BISULFATE 75 MG/1
300 TABLET ORAL ONCE
Status: DISCONTINUED | OUTPATIENT
Start: 2020-01-01 | End: 2020-01-01

## 2020-01-01 RX ORDER — AMLODIPINE BESYLATE 10 MG/1
10 TABLET ORAL DAILY
Status: DISCONTINUED | OUTPATIENT
Start: 2020-01-01 | End: 2020-01-01 | Stop reason: HOSPADM

## 2020-01-01 RX ORDER — POTASSIUM CHLORIDE 29.8 MG/ML
20 INJECTION INTRAVENOUS
Status: DISCONTINUED | OUTPATIENT
Start: 2020-01-01 | End: 2020-01-01 | Stop reason: HOSPADM

## 2020-01-01 RX ORDER — LEVOFLOXACIN 250 MG/1
250 TABLET, FILM COATED ORAL DAILY
Qty: 7 TABLET | Refills: 0 | Status: SHIPPED | OUTPATIENT
Start: 2020-01-01 | End: 2020-01-01

## 2020-01-01 RX ORDER — HEPARIN SOD,PORCINE/0.9 % NACL 5K/1000 ML
1000-10000 INTRAVENOUS SOLUTION INTRAVENOUS
Status: COMPLETED | OUTPATIENT
Start: 2020-01-01 | End: 2020-01-01

## 2020-01-01 RX ORDER — PROCHLORPERAZINE MALEATE 5 MG
5 TABLET ORAL EVERY 6 HOURS PRN
Status: DISCONTINUED | OUTPATIENT
Start: 2020-01-01 | End: 2020-01-01 | Stop reason: HOSPADM

## 2020-01-01 RX ORDER — FENTANYL CITRATE 50 UG/ML
25-50 INJECTION, SOLUTION INTRAMUSCULAR; INTRAVENOUS EVERY 5 MIN PRN
Status: DISCONTINUED | OUTPATIENT
Start: 2020-01-01 | End: 2020-01-01 | Stop reason: HOSPADM

## 2020-01-01 RX ORDER — NICOTINE POLACRILEX 4 MG
15-30 LOZENGE BUCCAL
Status: DISCONTINUED | OUTPATIENT
Start: 2020-01-01 | End: 2020-01-01 | Stop reason: HOSPADM

## 2020-01-01 RX ORDER — CLOPIDOGREL BISULFATE 75 MG/1
75 TABLET ORAL DAILY
Status: DISCONTINUED | OUTPATIENT
Start: 2020-01-01 | End: 2020-01-01 | Stop reason: HOSPADM

## 2020-01-01 RX ORDER — CEFTRIAXONE SODIUM 2 G/50ML
2 INJECTION, SOLUTION INTRAVENOUS ONCE
Status: COMPLETED | OUTPATIENT
Start: 2020-01-01 | End: 2020-01-01

## 2020-01-01 RX ORDER — HYDRALAZINE HYDROCHLORIDE 20 MG/ML
10 INJECTION INTRAMUSCULAR; INTRAVENOUS EVERY 6 HOURS PRN
Status: DISCONTINUED | OUTPATIENT
Start: 2020-01-01 | End: 2020-01-01

## 2020-01-01 RX ORDER — ASPIRIN 81 MG/1
81 TABLET, CHEWABLE ORAL DAILY
Status: DISCONTINUED | OUTPATIENT
Start: 2020-01-01 | End: 2020-01-01

## 2020-01-01 RX ORDER — AMOXICILLIN 250 MG
1-2 CAPSULE ORAL 2 TIMES DAILY PRN
Status: DISCONTINUED | OUTPATIENT
Start: 2020-01-01 | End: 2020-01-01

## 2020-01-01 RX ORDER — AMOXICILLIN 250 MG
1-2 CAPSULE ORAL 2 TIMES DAILY PRN
Status: DISCONTINUED | OUTPATIENT
Start: 2020-01-01 | End: 2020-01-01 | Stop reason: HOSPADM

## 2020-01-01 RX ORDER — SODIUM CHLORIDE 9 MG/ML
INJECTION, SOLUTION INTRAVENOUS CONTINUOUS
Status: DISCONTINUED | OUTPATIENT
Start: 2020-01-01 | End: 2020-01-01

## 2020-01-01 RX ORDER — ASPIRIN 81 MG/1
243 TABLET, CHEWABLE ORAL ONCE
Status: COMPLETED | OUTPATIENT
Start: 2020-01-01 | End: 2020-01-01

## 2020-01-01 RX ORDER — DEXTROSE MONOHYDRATE 25 G/50ML
25-50 INJECTION, SOLUTION INTRAVENOUS
Status: DISCONTINUED | OUTPATIENT
Start: 2020-01-01 | End: 2020-01-01 | Stop reason: HOSPADM

## 2020-01-01 RX ORDER — ASPIRIN 300 MG/1
300 SUPPOSITORY RECTAL ONCE
Status: COMPLETED | OUTPATIENT
Start: 2020-01-01 | End: 2020-01-01

## 2020-01-01 RX ORDER — ALBUTEROL SULFATE 0.83 MG/ML
2.5 SOLUTION RESPIRATORY (INHALATION) 4 TIMES DAILY
DISCHARGE
Start: 2020-01-01

## 2020-01-01 RX ORDER — IOPAMIDOL 755 MG/ML
50 INJECTION, SOLUTION INTRAVASCULAR ONCE
Status: COMPLETED | OUTPATIENT
Start: 2020-01-01 | End: 2020-01-01

## 2020-01-01 RX ORDER — POTASSIUM CHLORIDE 7.45 MG/ML
10 INJECTION INTRAVENOUS
Status: DISCONTINUED | OUTPATIENT
Start: 2020-01-01 | End: 2020-01-01 | Stop reason: HOSPADM

## 2020-01-01 RX ORDER — METOCLOPRAMIDE 5 MG/1
5 TABLET ORAL EVERY 6 HOURS PRN
Status: DISCONTINUED | OUTPATIENT
Start: 2020-01-01 | End: 2020-01-01 | Stop reason: HOSPADM

## 2020-01-01 RX ORDER — MIRTAZAPINE 30 MG/1
30 TABLET, FILM COATED ORAL
COMMUNITY
Start: 2020-01-01

## 2020-01-01 RX ORDER — ASPIRIN 81 MG/1
81 TABLET ORAL DAILY
Status: DISCONTINUED | OUTPATIENT
Start: 2020-01-01 | End: 2020-01-01 | Stop reason: HOSPADM

## 2020-01-01 RX ORDER — METOCLOPRAMIDE HYDROCHLORIDE 5 MG/ML
5 INJECTION INTRAMUSCULAR; INTRAVENOUS EVERY 6 HOURS PRN
Status: DISCONTINUED | OUTPATIENT
Start: 2020-01-01 | End: 2020-01-01 | Stop reason: HOSPADM

## 2020-01-01 RX ORDER — MAGNESIUM SULFATE HEPTAHYDRATE 40 MG/ML
4 INJECTION, SOLUTION INTRAVENOUS EVERY 4 HOURS PRN
Status: DISCONTINUED | OUTPATIENT
Start: 2020-01-01 | End: 2020-01-01 | Stop reason: HOSPADM

## 2020-01-01 RX ORDER — LEVOFLOXACIN 500 MG/1
500 TABLET, FILM COATED ORAL ONCE
Status: COMPLETED | OUTPATIENT
Start: 2020-01-01 | End: 2020-01-01

## 2020-01-01 RX ORDER — MULTIPLE VITAMINS W/ MINERALS TAB 9MG-400MCG
1 TAB ORAL DAILY
COMMUNITY

## 2020-01-01 RX ORDER — IOPAMIDOL 755 MG/ML
128 INJECTION, SOLUTION INTRAVASCULAR ONCE
Status: COMPLETED | OUTPATIENT
Start: 2020-01-01 | End: 2020-01-01

## 2020-01-01 RX ORDER — HEPARIN SODIUM 5000 [USP'U]/.5ML
5000 INJECTION, SOLUTION INTRAVENOUS; SUBCUTANEOUS EVERY 8 HOURS
Status: DISCONTINUED | OUTPATIENT
Start: 2020-01-01 | End: 2020-01-01

## 2020-01-01 RX ORDER — IPRATROPIUM BROMIDE AND ALBUTEROL SULFATE 2.5; .5 MG/3ML; MG/3ML
3 SOLUTION RESPIRATORY (INHALATION)
Status: DISCONTINUED | OUTPATIENT
Start: 2020-01-01 | End: 2020-01-01 | Stop reason: ALTCHOICE

## 2020-01-01 RX ORDER — POTASSIUM CHLORIDE 750 MG/1
20-40 TABLET, EXTENDED RELEASE ORAL
Status: DISCONTINUED | OUTPATIENT
Start: 2020-01-01 | End: 2020-01-01 | Stop reason: HOSPADM

## 2020-01-01 RX ORDER — POTASSIUM CHLORIDE 1.5 G/1.58G
20-40 POWDER, FOR SOLUTION ORAL
Status: DISCONTINUED | OUTPATIENT
Start: 2020-01-01 | End: 2020-01-01 | Stop reason: HOSPADM

## 2020-01-01 RX ORDER — ONDANSETRON 2 MG/ML
4 INJECTION INTRAMUSCULAR; INTRAVENOUS EVERY 6 HOURS PRN
Status: DISCONTINUED | OUTPATIENT
Start: 2020-01-01 | End: 2020-01-01 | Stop reason: HOSPADM

## 2020-01-01 RX ADMIN — CLOPIDOGREL BISULFATE 75 MG: 75 TABLET, FILM COATED ORAL at 07:49

## 2020-01-01 RX ADMIN — Medication 12.5 MG: at 14:35

## 2020-01-01 RX ADMIN — CLOPIDOGREL BISULFATE 75 MG: 75 TABLET, FILM COATED ORAL at 17:03

## 2020-01-01 RX ADMIN — UMECLIDINIUM 1 PUFF: 62.5 AEROSOL, POWDER ORAL at 07:37

## 2020-01-01 RX ADMIN — Medication 5000 UNITS: at 20:51

## 2020-01-01 RX ADMIN — AMLODIPINE BESYLATE 10 MG: 10 TABLET ORAL at 09:17

## 2020-01-01 RX ADMIN — FLUTICASONE FUROATE AND VILANTEROL TRIFENATATE 1 PUFF: 200; 25 POWDER RESPIRATORY (INHALATION) at 07:37

## 2020-01-01 RX ADMIN — SODIUM CHLORIDE 10 ML: 9 INJECTION, SOLUTION INTRAMUSCULAR; INTRAVENOUS; SUBCUTANEOUS at 08:32

## 2020-01-01 RX ADMIN — PRAVASTATIN SODIUM 10 MG: 10 TABLET ORAL at 07:49

## 2020-01-01 RX ADMIN — Medication 5000 UNITS: at 04:02

## 2020-01-01 RX ADMIN — ALBUTEROL SULFATE 2.5 MG: 2.5 SOLUTION RESPIRATORY (INHALATION) at 13:00

## 2020-01-01 RX ADMIN — Medication 5000 UNITS: at 03:18

## 2020-01-01 RX ADMIN — PRAVASTATIN SODIUM 10 MG: 10 TABLET ORAL at 16:08

## 2020-01-01 RX ADMIN — ALBUTEROL SULFATE 2.5 MG: 2.5 SOLUTION RESPIRATORY (INHALATION) at 19:08

## 2020-01-01 RX ADMIN — Medication 5000 UNITS: at 04:45

## 2020-01-01 RX ADMIN — LABETALOL 20 MG/4 ML (5 MG/ML) INTRAVENOUS SYRINGE 10 MG: at 04:47

## 2020-01-01 RX ADMIN — ALBUTEROL SULFATE 2.5 MG: 2.5 SOLUTION RESPIRATORY (INHALATION) at 09:15

## 2020-01-01 RX ADMIN — ASPIRIN 325 MG: 325 TABLET ORAL at 09:15

## 2020-01-01 RX ADMIN — Medication 12.5 MG: at 07:49

## 2020-01-01 RX ADMIN — AMLODIPINE BESYLATE 5 MG: 5 TABLET ORAL at 16:08

## 2020-01-01 RX ADMIN — FLUTICASONE FUROATE AND VILANTEROL TRIFENATATE 1 PUFF: 200; 25 POWDER RESPIRATORY (INHALATION) at 09:26

## 2020-01-01 RX ADMIN — ASPIRIN 81 MG CHEWABLE TABLET 81 MG: 81 TABLET CHEWABLE at 09:29

## 2020-01-01 RX ADMIN — AMLODIPINE BESYLATE 10 MG: 10 TABLET ORAL at 07:49

## 2020-01-01 RX ADMIN — IOPAMIDOL 100 ML: 612 INJECTION, SOLUTION INTRAVENOUS at 16:04

## 2020-01-01 RX ADMIN — ALBUTEROL SULFATE 2.5 MG: 2.5 SOLUTION RESPIRATORY (INHALATION) at 08:04

## 2020-01-01 RX ADMIN — ALBUTEROL SULFATE 2.5 MG: 2.5 SOLUTION RESPIRATORY (INHALATION) at 15:40

## 2020-01-01 RX ADMIN — ALBUTEROL SULFATE 2.5 MG: 2.5 SOLUTION RESPIRATORY (INHALATION) at 20:04

## 2020-01-01 RX ADMIN — ASPIRIN 300 MG: 300 SUPPOSITORY RECTAL at 17:13

## 2020-01-01 RX ADMIN — ASPIRIN 81 MG CHEWABLE TABLET 243 MG: 81 TABLET CHEWABLE at 16:09

## 2020-01-01 RX ADMIN — MIDAZOLAM 0.5 MG: 1 INJECTION INTRAMUSCULAR; INTRAVENOUS at 11:31

## 2020-01-01 RX ADMIN — GADOBUTROL 9 ML: 604.72 INJECTION INTRAVENOUS at 15:12

## 2020-01-01 RX ADMIN — FLUTICASONE FUROATE AND VILANTEROL TRIFENATATE 1 PUFF: 200; 25 POWDER RESPIRATORY (INHALATION) at 17:13

## 2020-01-01 RX ADMIN — FENTANYL CITRATE 25 MCG: 50 INJECTION, SOLUTION INTRAMUSCULAR; INTRAVENOUS at 11:14

## 2020-01-01 RX ADMIN — IODIXANOL 75 ML: 320 INJECTION, SOLUTION INTRAVASCULAR at 11:48

## 2020-01-01 RX ADMIN — SODIUM CHLORIDE: 9 INJECTION, SOLUTION INTRAVENOUS at 16:00

## 2020-01-01 RX ADMIN — UMECLIDINIUM 1 PUFF: 62.5 AEROSOL, POWDER ORAL at 17:12

## 2020-01-01 RX ADMIN — UMECLIDINIUM 1 PUFF: 62.5 AEROSOL, POWDER ORAL at 09:26

## 2020-01-01 RX ADMIN — HEPARIN SODIUM 20000 UNITS: 1000 INJECTION, SOLUTION INTRAVENOUS; SUBCUTANEOUS at 11:10

## 2020-01-01 RX ADMIN — LABETALOL 20 MG/4 ML (5 MG/ML) INTRAVENOUS SYRINGE 10 MG: at 05:20

## 2020-01-01 RX ADMIN — FENTANYL CITRATE 25 MCG: 50 INJECTION, SOLUTION INTRAMUSCULAR; INTRAVENOUS at 10:43

## 2020-01-01 RX ADMIN — LEVOFLOXACIN 500 MG: 500 TABLET, FILM COATED ORAL at 17:00

## 2020-01-01 RX ADMIN — CEFTRIAXONE SODIUM 2 G: 2 INJECTION, SOLUTION INTRAVENOUS at 17:00

## 2020-01-01 RX ADMIN — NICOTINE 1 PATCH: 21 PATCH TRANSDERMAL at 07:52

## 2020-01-01 RX ADMIN — IOPAMIDOL 50 ML: 755 INJECTION, SOLUTION INTRAVENOUS at 07:43

## 2020-01-01 RX ADMIN — IOPAMIDOL 128 ML: 755 INJECTION, SOLUTION INTRAVENOUS at 10:14

## 2020-01-01 RX ADMIN — NICOTINE 1 PATCH: 21 PATCH TRANSDERMAL at 09:14

## 2020-01-01 RX ADMIN — NICOTINE 1 PATCH: 21 PATCH TRANSDERMAL at 16:59

## 2020-01-01 RX ADMIN — ASPIRIN 81 MG: 81 TABLET, COATED ORAL at 07:49

## 2020-01-01 RX ADMIN — HUMAN ALBUMIN MICROSPHERES AND PERFLUTREN 5 ML: 10; .22 INJECTION, SOLUTION INTRAVENOUS at 08:45

## 2020-01-01 RX ADMIN — SODIUM CHLORIDE: 9 INJECTION, SOLUTION INTRAVENOUS at 04:02

## 2020-01-01 RX ADMIN — ALBUTEROL SULFATE 2.5 MG: 2.5 SOLUTION RESPIRATORY (INHALATION) at 07:38

## 2020-01-01 RX ADMIN — ALBUTEROL SULFATE 2.5 MG: 2.5 SOLUTION RESPIRATORY (INHALATION) at 13:16

## 2020-01-01 RX ADMIN — MIDAZOLAM 0.5 MG: 1 INJECTION INTRAMUSCULAR; INTRAVENOUS at 11:13

## 2020-01-01 RX ADMIN — PRAVASTATIN SODIUM 10 MG: 10 TABLET ORAL at 09:15

## 2020-01-01 RX ADMIN — Medication 5000 UNITS: at 20:30

## 2020-01-01 RX ADMIN — FENTANYL CITRATE 25 MCG: 50 INJECTION, SOLUTION INTRAMUSCULAR; INTRAVENOUS at 11:40

## 2020-01-01 RX ADMIN — Medication 5000 UNITS: at 20:03

## 2020-01-01 RX ADMIN — FLUTICASONE FUROATE AND VILANTEROL TRIFENATATE 1 PUFF: 200; 25 POWDER RESPIRATORY (INHALATION) at 12:57

## 2020-01-01 RX ADMIN — LIDOCAINE HYDROCHLORIDE 6 ML: 10 INJECTION, SOLUTION EPIDURAL; INFILTRATION; INTRACAUDAL; PERINEURAL at 10:43

## 2020-01-01 RX ADMIN — UMECLIDINIUM 1 PUFF: 62.5 AEROSOL, POWDER ORAL at 09:15

## 2020-01-01 RX ADMIN — Medication 5000 UNITS: at 12:49

## 2020-01-01 RX ADMIN — LABETALOL 20 MG/4 ML (5 MG/ML) INTRAVENOUS SYRINGE 10 MG: at 05:04

## 2020-01-01 RX ADMIN — MIDAZOLAM 0.5 MG: 1 INJECTION INTRAMUSCULAR; INTRAVENOUS at 10:42

## 2020-01-01 RX ADMIN — LABETALOL 20 MG/4 ML (5 MG/ML) INTRAVENOUS SYRINGE 10 MG: at 04:33

## 2020-01-01 RX ADMIN — Medication 12.5 MG: at 20:03

## 2020-01-01 RX ADMIN — SODIUM CHLORIDE 1000 ML: 9 INJECTION, SOLUTION INTRAVENOUS at 15:04

## 2020-01-01 ASSESSMENT — VISUAL ACUITY
OU: BLURRED VISION
OU: GLASSES
OU: BLURRED VISION
OU: GLASSES

## 2020-01-01 ASSESSMENT — ACTIVITIES OF DAILY LIVING (ADL)
ADLS_ACUITY_SCORE: 25
ADLS_ACUITY_SCORE: 20
ADLS_ACUITY_SCORE: 22
ADLS_ACUITY_SCORE: 22
ADLS_ACUITY_SCORE: 24
ADLS_ACUITY_SCORE: 25
ADLS_ACUITY_SCORE: 24
ADLS_ACUITY_SCORE: 22
ADLS_ACUITY_SCORE: 19
ADLS_ACUITY_SCORE: 23
ADLS_ACUITY_SCORE: 25
ADLS_ACUITY_SCORE: 24
ADLS_ACUITY_SCORE: 18
ADLS_ACUITY_SCORE: 17
ADLS_ACUITY_SCORE: 17
ADLS_ACUITY_SCORE: 22

## 2020-01-01 ASSESSMENT — ENCOUNTER SYMPTOMS
SHORTNESS OF BREATH: 0
WEAKNESS: 0
SPEECH DIFFICULTY: 0
FEVER: 0
SHORTNESS OF BREATH: 0
COUGH: 1
DIZZINESS: 0
FACIAL ASYMMETRY: 1
HEADACHES: 0
APPETITE CHANGE: 0
FATIGUE: 0
CHILLS: 0
ACTIVITY CHANGE: 1
LIGHT-HEADEDNESS: 0
NAUSEA: 0
ABDOMINAL PAIN: 0
DIARRHEA: 0
WEAKNESS: 1
ABDOMINAL PAIN: 0
VOMITING: 0
FEVER: 0

## 2020-01-07 PROBLEM — I63.9 CVA (CEREBRAL VASCULAR ACCIDENT) (H): Status: ACTIVE | Noted: 2020-01-01

## 2020-01-07 NOTE — SIGNIFICANT EVENT
Arrival to Stroke Code: 1000    Stroke Team escalate/de-escalate interventions: CT/CTA, IR    ED/Bedside Nurse providing handoff: Kirsten CUNNINGHAM    Time left for CT: 1005    Time to IR: 1025    ED/Bedside Nurse given handoff to & Time: 1025, report to Ranulfo in IR

## 2020-01-07 NOTE — ED NOTES
Patient is going to be transferred to [facility] through the emergency department.  Dr. Aviles accepting to a neuro ICU bed.

## 2020-01-07 NOTE — ED NOTES
Pt brought in by [facility],  Code Stroke was activated PTA, per report pt last well known was at 0630 this am, family called 911, reported that pt was having left sided weakness, with  Slight facial droop on left side, smile is symmetrical. Upon arrival pt alert and oriented x3,makes needs known, speech is slurred.  in hallway to meet and assess pt; immediatly brought to CT.  in CT to assess pt. Returned from CT at 0750, monitoring to be placed and EKG to be done.

## 2020-01-07 NOTE — H&P
"  [facility], [place]    Stroke Admission Note    Chief Complaint  L sided weakness and L neglect    HPI  Efe Cardona is a 84 year old male history of prior stroke (per family secondary to aortic atheroma), BPH, tobacco/COPD who presents as transfer for possible thrombectomy. Pt presented to [facility] and was seen via telestroke by Dr. Cameron.    Per Dr. Cameron's note prior to transfer:  \"Efe Cardona is a 84 year old male with history of prior stroke (per family secondary to aortic atheroma), BPH, tobacco/COPD, who presents with acute onset L hemiparesis and neglect.  Per family, from his prior stroke he had minimal residual weakness in the left leg and decreased fine motor skill in his hand (they are unsure which).  This morning, his wife noted that when he woke up at 630 he was unable to support himself on his left leg.  He went to make coffee and she heard him fall at 630.  He was last normal when he went to bed yesterday at 10pm. Head CT shows multiple chronic embolic strokes and microvascular disease. The insula may be infarcted.  CTA shows diffuse atherosclerosis with an occlusion of the R. ICA and M1 segment.  There is contrast opacification within the R M2 branches of the sylvian fissure. At baseline he is able to take care of himself, but is less active.  He drives to his own appointments alone and can stay home alone when family leaves. At baseline he is able to take care of himself, but is less active.  He drives to his own appointments alone and can stay home alone when family leaves.\"    Pt was airlifted to [facility] for further stroke management. Upon arrival, stroke code activated and IR called. NIHSS 8. TPA was not given as he was outside of the time window.    Repeat CT head with loss of gray-white differentiation in the right insula and right frontal operculum concerning for right MCA territory infarct and multiple prior infarcts. Repeat CTA " head and neck with slightly decreased visualization of distal branches of R MCA and no change in R ICA occlusion when compared to CTA from earlier in the day. CT perfusion was obtained which showed a core infarct within R MCA territory within R opercuclum and R insula area with surrounding penumbra, though it was unclear if perfusion study was influenced by his prior strokes.     Pt was taken for endovascular treatment; however, IR was unable to access R MCA due to chronic R ICA occlusion. Therefore, there was no intervention.     Of note, pt was initially considered for TIMELESS trial though these findings but then ruled out based on CT perfusion and MRI brain revealing large infarct in R MCA territory.     TPA Treatment   Not given due to outside the time window.    Endovascular Treatment  Endovascular treatment initiated for distal R MCA occlusion. This was unsuccessful as there is a chronic R ICA occlusion, therefore, unable to access distal R MCA.    Assessment/Plan:  85 yo male with history of prior stroke (per family secondary to aortic atheroma), BPH, tobacco/COPD  admitted on 1/7/20 with left sided weakness and left sided neglect. Found to have R ICA occlusion and possible occlusion of distal R MCA.  Transferred to [facility] for further management.    Neuro:  Acute Ischemic Stroke involving R MCA territory (without tPA) Plan  - Admit to Neurology  - Neurochecks Q 2 hour  - Permissive HTN, labetalol PRN for SBP > 220   - Avoid hypotonic IV fluids  - Rectal Aspirin today  - Statin: NPO pending swallow eval  - Telemetry, EKG  - Bedside Glucose Monitoring  - A1c, Lipid Panel, Troponin x 3  - PT/OT/SLP  - Stroke Education  - Depression Screen  - Apnea Screen  - Euthermia, Euglycemia     Resp:  #COPD  - resume PTA abuterol q4h PRN  - resume PTA spiriva daily  - continuous pulse ox monitoring   - maintain O2 saturations > 92%     #Tobacco use  - Nicotine patch    Cardio:  #Hypertension  - Hold PTA amlodipine  5 mg  - Permissive HTN, labetalol PRN for SBP > 220     #HLD  - Hold PTA simvastatin 10 mg as pt is NPO  - LDL ordered for stroke work up    Renal:  No active issues.    - IVF: NS x 75 ml/hr  - Electrolyte protocol ordered  - Monitor I/O  - Kaur in place    Endo:  No active issues.    Heme:  #Mild leukocytosis  - see under ID    GI:  No active issues.    - Diet: NPO until SLP evaluation  - Bowel Regimen: PRNs ordered    ID:  #Mild leukocytosis  - No signs of infection, likely stress response  - Monitor for signs of infection; pan culture if >100.4    FEN: NPO   PPX:    DVT prophylaxis: SCDs, heparin subcutaneous 5000 q8h     GI: Not indicated  Code Status: Full, presumed  Dispo: 6A tomorrow    During initial physical assessment, the plan of care was discussed and developed with patient.  Plan of care includes: potential thrombectomy and admit to neuro icu.    Patient was admitted via [facility] ED ([place])    The patient will be admitted to the Neuro Critical Care/Stroke team..     The patient was seen and discussed with Dr. Gordon.    Tressa Corbett MD  Neurology PGY2  [phone]  ___________________________________________________    Past Medical History   Past Medical History:   Diagnosis Date     Atheroma 8/14/13    severe, of aortic arch and descending aorta     BPH (benign prostatic hyperplasia)      Cerebral embolism with cerebral infarction (H) 7/2013    source:  aortic atheroma     COPD (chronic obstructive pulmonary disease) (H)      Hypertension      Past Surgical History   Past Surgical History:   Procedure Laterality Date     ENDOSCOPY UPPER, COLONOSCOPY, COMBINED  2/12/2014    Procedure: COMBINED ENDOSCOPY UPPER, COLONOSCOPY;  UPPER ENDOSCOPY WITH BIOPSIES  AND COLONOSCOPY WITH POLYPECTOMY;  Surgeon: Reta Gallagher MD;  Location: [facility] OR     EXCISE CYST THYROGLOSSAL DUCT       VASECTOMY       Medications   Home Meds  Prior to Admission medications    Medication Sig Start Date End Date Taking?  Authorizing Provider   albuterol (PROAIR HFA, PROVENTIL HFA, VENTOLIN HFA) 108 (90 BASE) MCG/ACT inhaler Inhale 2 puffs into the lungs every 4 hours as needed for shortness of breath / dyspnea 13   Reggie Abraham MD   amLODIPine (NORVASC) 5 MG tablet Take 1 tablet by mouth daily. 13   Lucia Membreno MD   aspirin  MG tablet Take 1 tablet (325 mg) by mouth daily 14   Cj Perry DO   BENAZEPRIL HCL PO Take 40 mg by mouth daily.    Reported, Patient   fluticasone-salmeterol (ADVAIR) 250-50 MCG/DOSE diskus inhaler Inhale 1 puff into the lungs every 12 hours. 13   Lucia Membreno MD   mupirocin (BACTROBAN) 2 % cream Apply topically 3 times daily 13   Reggie Abraham MD   nicotine (NICODERM CQ) 21 MG/24HR patch 2h hr Place 1 patch onto the skin daily. 13   Lucia Membreno MD   simvastatin (ZOCOR) 10 MG tablet Take 1 tablet by mouth At Bedtime. 13   Lucia Membreno MD   tiotropium (SPIRIVA) 18 MCG inhalation capsule Inhale 1 capsule into the lungs daily. 13   Lucia Membreno MD       Scheduled Meds    sodium chloride 0.9%  500 mL Intravenous Once       Infusion Meds    niCARdipine 40 mg in 200 mL 0.9% NaCl         PRN Meds  fentaNYL, flumazenil, midazolam, naloxone, niCARdipine 40 mg in 200 mL 0.9% NaCl    Allergies   Allergies   Allergen Reactions     Doxazosin      Light sensitivity     Hydrochlorothiazide      Renal insufficiency     Penicillins      Family History   Family History   Problem Relation Age of Onset     Cancer - colorectal Mother      Cancer Sister 79        unknown primary     Social History   Social History     Tobacco Use     Smoking status: Former Smoker     Packs/day: 0.50     Years: 60.00     Pack years: 30.00     Last attempt to quit: 2013     Years since quittin.4     Smokeless tobacco: Former User   Substance Use Topics     Alcohol use: No     Drug use: No       Review of Systems   The 10 point  Review of Systems is negative other than noted in the HPI or here.        PHYSICAL EXAMINATION  Temp:  [96.7  F (35.9  C)-97.7  F (36.5  C)] 97.7  F (36.5  C)  Pulse:  [70-96] 70  Heart Rate:  [74-96] 80  Resp:  [13-20] 20  BP: (125-203)/() 203/109  SpO2:  [93 %-100 %] 100 %    General:  patient lying in bed without any acute distress    HEENT:  normocephalic/atraumatic  Cardio:  RRR  Pulmonary:  no respiratory distress  Abdomen:  not examined  Extremities:  no edema  Skin:  intact     Neurologic  Mental Status:  Awake, alert, knows month/age, follows simple commands, naming and repetition normal, speech fluent with moderate dysarthria, left sided neglect   Cranial Nerves:  Likely has a L visual field cut (difficult to adequately assess),  EOMI and crosses midline to the L though not able to fully bury sclera, hearing not formally tested but intact to conversation (though hard of hearing), facial sensation intact, left facial droop, moderate dysarthria  Motor:  normal muscle tone and bulk, able to move all limbs spontaneously antigravity, no drift in BUE though notable for pronation in LUE.  2-3/5 on left (difficult to assess as he neglect left but overall weaker ) and 5/5 on R . LLE with drift, no drift in RLE. Strength 3/5 on left hip flexion, 3/5 dorsiflexion and 4/5 plantar flexion. Strength 4/5 on right hip flexion, 4+/5 dorsiflexion and 4+/5 plantarflexion.   Reflexes:  deferred  Sensory:  no withdrawal to noxious in left upper and lower extremity. Intact to light touch in R upper and lower extremity.  Coordination:  FNF on left with dysmetria, intact on right.   Station/Gait:  deferred    Dysphagia Screen  Dysarthria or facial droop present - Maintain NPO, consult SLP    Modified [place] Scale (pre-stroke):   2-Slight disability; unable to carry out all previous activities, but able to look after own affairs     Stroke Scales    NIHSS  Interval     Interval Comments stroke code (01/07/20  1154)   1a. Level of Consciousness 0-->Alert, keenly responsive   1b. LOC Questions 0-->Answers both questions correctly   1c. LOC Commands 0-->Performs both tasks correctly   2.   Best Gaze 0-->Normal   3.   Visual 0-->No visual loss   4.   Facial Palsy 2-->Partial paralysis (total or near-total paralysis of lower face)   5a. Motor Arm, Left 0-->No drift, limb holds 90 (or 45) degrees for full 10 secs   5b. Motor Arm, Right 0-->No drift, limb holds 90 (or 45) degrees for full 10 secs   6a. Motor Leg, Left 0-->No drift, leg holds 30 degree position for full 5 secs   6b. Motor Leg, right 0-->No drift, leg holds 30 degree position for full 5 secs   7.   Limb Ataxia 1-->Present in one limb   8.   Sensory 2-->Severe to total sensory loss, patient is not aware of being touched in the face, arm, and leg   9.   Best Language 0-->No aphasia, normal   10. Dysarthria 1-->Mild-to-moderate dysarthria, patient slurs at least some words and, at worst, can be understood with some difficulty   11. Extinction and Inattention  2-->Profound paige-inattention/extinction more than 1 modality   Total 8 (01/07/20 1154)       Imaging  I personally reviewed all imaging; relevant findings per the HPI.    Lab Results Data   CBC  Recent Labs   Lab 01/07/20  1004 01/07/20  0805   WBC 12.8* 8.3   RBC 4.85 4.69   HGB 14.8 14.5   HCT 45.5 42.9    197     Basic Metabolic Panel   Recent Labs   Lab 01/07/20  1004 01/07/20  0805    140   POTASSIUM 4.4 4.3   CHLORIDE 110* 112*   CO2 26 24   BUN 25 24   CR 1.47* 1.44*   * 125*   LAWRENCE 8.8 8.4*     Liver Panel  Recent Labs   Lab Test 01/07/20  0805 07/19/13  0135   PROTTOTAL 6.5* 7.3   ALBUMIN 3.3* 3.6   BILITOTAL 0.8 0.5   ALKPHOS 84 77   AST 19 16   ALT 34 26     INR  Recent Labs   Lab Test 01/07/20  1004 01/07/20  0805 02/13/14  0540   INR 1.05 1.10 1.24*      Lipid Profile  Recent Labs   Lab Test 07/19/13  0139   CHOL 148   HDL 28*      TRIG 101   CHOLHDLRATIO 5.3*     A1CNo  lab results found.  Troponin I  Recent Labs   Lab 01/07/20  1004 01/07/20  0805   TROPI <0.015 <0.015          Stroke Code / Stroke Consult Data Data    Stroke code activated 01/07/20   0954   First stroke provider response 01/07/20   0956   Last known normal 01/06/20   2200   Time of discovery   (or onset of symptoms)         Head CT read by me 01/07/20   1005   Was stroke code de-escalated?

## 2020-01-07 NOTE — ED NOTES
Bed: ED02  Expected date:   Expected time:   Means of arrival:   Comments:  Efe Yoon. MRN: [id number]. Coming from [facility] for a stroke.

## 2020-01-07 NOTE — ED TRIAGE NOTES
Patient arrived via helicopter with left side flaccidity and weakness from [facility]. Flight crew reports last known well 6:30am. Hx of previous stroke. MD and neurology at bedside upon arrival.

## 2020-01-07 NOTE — PLAN OF CARE
4A PT hold: per EMR patient with current activity orders for bedrest. PT attempt contact for eval on 1/8.

## 2020-01-07 NOTE — LETTER
Transition Communication Hand-off for Care Transitions to Next Level of Care Provider    Name: Efe Cardona  : 1935  MRN #: [id number]  Primary Care Provider: Reggie Abraham     Primary Clinic: [facility] [address] [place] [id number]     Reason for Hospitalization:  Cerebrovascular accident (CVA), unspecified mechanism (H) [I63.9]  Admit Date/Time: 2020  9:54 AM  Discharge Date: 1/10/2020  Payor Source: Payor: BCBS / Plan: BCBS PLATINUM BLUE / Product Type: PPO /              Reason for Communication Hand-off Referral:     Discharge Plan:     Social Work Services Discharge Note     Patient Name:  Efe Cardona     Anticipated Discharge Date:  1/10/2020     Discharge Disposition:   [facility] ([phone])     Following MD:  Facility Assignment     Pre-Admission Screening (PAS) online form has been completed.  The Level of Care (LOC) is:  Determined  Confirmation Code is:  Not completed as pt is admitting to acute rehab unit.        Additional Services/Equipment Arranged:  REYNA has confirmed readiness for discharge with Dr. Robledo. REYNA has confirmed acceptance to [facility] with Admissions ([place]).  However, [facility] will not accept pt for admit on 1/10/2020 if pt BP is over 180, if pt requires IV BP meds or PRN BP meds between now and 10am tomorrow.   has arranged for [employer] ([facility] [phone]) to provide private pay stretcher transport on 1/10/2020 at 10am. Pt and wife are aware that payment for transport must be made upon arrival to [facility].     Patient / Family response to discharge plan:  Pt, wife, and son in law voice understanding of the discharge plan and agreement with the discharge plan.     Persons notified of above discharge plan:  Pt, wife, son in law, Dr Robledo and 6A nursing.     Staff Discharge Instructions:  Please fax discharge orders and signed hard scripts for any controlled substances (SW will complete this task).  Please  print a packet and send with patient.      CTS Handoff completed:  YES     Medicare Notice of Rights provided to the patient/family:  YES     DANIELITO Morgan  Social Work, 6A  Phone:  [phone]  Pager:  [phone]  1/9/2020

## 2020-01-07 NOTE — PROGRESS NOTES
01/07/20 1600   General Information   Onset Date 01/07/20   Start of Care Date 01/07/20   Referring Physician Tressa Corbett MD   Patient Profile Review/OT: Additional Occupational Profile Info See Profile for full history and prior level of function   Patient/Family Goals Statement patient wants to drink H20   Swallowing Evaluation Bedside swallow evaluation   Behaviorial Observations WNL (within normal limits)   Mode of current nutrition NPO  (strict NPO pending SLP eval)   Respiratory Status Room air   Comments 85 yo male with history of prior stroke (per family secondary to aortic atheroma), BPH, tobacco/COPD  admitted on 1/7/20 with left sided weakness and left sided neglect. Found to have R ICA occlusion and possible occlusion of distal R MCA.  Transferred to [facility] for further management. SLP completed bedside swallowing assessment in reverse trendelenberg position at 4:00 PM on 1/7/2020.   Clinical Swallow Evaluation   Oral Musculature anomalies present  (Left facial droop)   Structural Abnormalities none present   Dentition present and adequate   Secretion Management   (WFL)   Mucosal Quality adequate   Mandibular Strength and Mobility intact   Oral Labial Strength and Mobility impaired retraction;impaired pursing;impaired seal;impaired coordination  (left weakness)   Lingual Strength and Mobility impaired protrusion;impaired anterior elevation;impaired left lateral movement;impaired right lateral movement;impaired coordination  (left weakness)   Velar Elevation intact   Buccal Strength and Mobility impaired   Laryngeal Function Cough;Throat clear;Swallow;Voicing initiated;Dry swallow palpated  (wet/congestion intermittently present, related to COPD)   Oral Musculature Comments Left sided facial weakness, reduced awareness on left   Clinical Swallow Eval: Thin Liquid Texture Trial   Mode of Presentation, Thin Liquids spoon;straw;fed by clinician   Volume of Liquid or Food Presented 6 oz  thin H20   Oral Phase of Swallow WFL   Oral Residue   (trace left drooling x1)   Pharyngeal Phase of Swallow intact   Diagnostic Statement WFL with one small sip at a time by spoon and straw   Clinical Swallow Eval: Puree Solid Texture Trial   Mode of Presentation, Puree spoon;fed by clinician   Volume of Puree Presented 3 oz applesauce, one teaspoon at a time   Oral Phase, Puree WFL   Oral Residue, Puree   (minimal left residue cleared with liquid wash)   Pharyngeal Phase, Puree intact   Diagnostic Statement mild left oral impairment, but WFL with small bites of puree. Not felt appropriate for advanced solids due to left weakness and positioning restriction.   Swallow Compensations   Swallow Compensations Alternate viscosity of consistencies;Pacing;Reduce amounts   Results No difficulties noted   Esophageal Phase of Swallow   Patient reports or presents with symptoms of esophageal dysphagia No   General Therapy Interventions   Planned Therapy Interventions Dysphagia Treatment   Dysphagia treatment Oropharyngeal exercise training;Modified diet education;Instruction of safe swallow strategies   Swallow Eval: Clinical Impressions   Skilled Criteria for Therapy Intervention Skilled criteria met.  Treatment indicated.   Functional Assessment Scale (FAS) 5   Treatment Diagnosis mild oropharyngeal dysphagia   Diet texture recommendations Full liquid;Thin liquids   Recommended Feeding/Eating Techniques alternate between small bites and sips of food/liquid;check mouth frequently for oral residue/pocketing;maintain upright posture during/after eating for 30 mins;small sips/bites  (upright for PO when medically appropriate)   Demonstrates Need for Referral to Another Service occupational therapy;physical therapy   Therapy Frequency 5x/week   Predicted Duration of Therapy Intervention (days/wks) 1 week   Anticipated Discharge Disposition inpatient rehabilitation facility   Risks and Benefits of Treatment have been explained.  Yes   Patient, family and/or staff in agreement with Plan of Care Yes   Clinical Impression Comments Clinical dysphagia examination completed per MD order. The patient was on strict bedrest and unable to sit fully upright, but he was positioned by RN in reverse trendelenburg to be as upright as possible for participation in this exam. The patient presents with moderate left facial droop. Left neglect strongly suspected as well.  The patient was able to tolerate small single sips of thin liquid by spoon and straw and small bites of applesauce without overt signs/symptoms of aspiration. Advanced solids not served due to left oral weakness and positioning restriction. Recommend the patient initiate one small sip at a time with a thin consistency full liquid diet when as upright as possible. SLP will plan to follow up for diet tolerance and to assess potential for further diet advancement tomorrow when able to sit upright.   Total Evaluation Time   Total Evaluation Time (Minutes) 20

## 2020-01-07 NOTE — PROGRESS NOTES
Patient Name: Efe Cardona  Medical Record Number: [id number]  Today's Date: 1/7/2020    Procedure: Cerebral Angiogram emergency  Proceduralist: Dr. Nacho Pritchett, Dr. Ernst Aggarwal.    Sedation start time: 1044  Sedation end time: 1200  Sedation medications administered: 1.5 mg versed, 75 mcg Fentanyl.   Total sedation time: 76 minutes    Patient in room:  1027   Procedure start time: 1042  Puncture time: 1043  Procedure end time: 1200  Patient out of room: 1210    Report given to: JODY Boo.    Other Notes: Pt arrived to IR room 3 from CT. Consent reviewed. Pt denies any questions or concerns regarding procedure. Pt positioned supine and monitored per protocol. Pt tolerated procedure without any noted complications. Pt transferred to A. with R.N.    Manual pressure applied to right groin at 1138 to 1158 by Dr. Aggarwal following sheath removal.  Patient should remain flat for 6 hours, until 1800.

## 2020-01-07 NOTE — CONSULTS
[facility]    Stroke Consult Note    Reason for Consult: R. MCA syndrome    Chief Complaint: CVA      HPI  Efe Cardona is a 84 year old male with history of prior stroke (per family secondary to aortic atheroma), BPH, tobacco/COPD, who presents with acute onset L hemiparesis and neglect.  Per family, from his prior stroke he had minimal residual weakness in the left leg and decreased fine motor skill in his hand (they are unsure which).  This morning, his wife noted that when he woke up at 630 he was unable to support himself on his left leg.  He went to make coffee and she heard him fall at 630.  He was last normal when he went to bed yesterday at 10pm.    Head CT shows multiple chronic embolic strokes and microvascular disease. The insula may be infarcted.  CTA shows diffuse atherosclerosis with an occlusion of the R. ICA and M1 segment.  There is contrast opacification within the R, M2 branches of the sylvian fissure.    At baseline he is able to take care of himself, but is less active.  He drives to his own appointments alone and can stay home alone when family leaves.      TPA Treatment   Not given due to outside the time window.    Endovascular Treatment  Endovascular treatment initiated for ICA/M1 occlusion    Impression  Ischemic Stroke due to large-artery atherosclerosis (embolus/thrombosis)    Recommendations  1. Stat transfer for thrombectomy  2. Evaluation for TIMELESS trial -- family consented over Telestroke  3. Post-interventional care [facility] ICU    Patient Follow-up    - final recommendation pending work-up    Thank you for this consult. We will admit this patient to the Stroke Service.    Papo Cameron MD, MS  Vascular Neurology    Text Page ([phone])  ______________________________________________________    Past Medical History   Past Medical History:   Diagnosis Date     Atheroma 8/14/13    severe, of aortic arch and descending aorta     BPH (benign prostatic hyperplasia)       Cerebral embolism with cerebral infarction (H) 7/2013    source:  aortic atheroma     COPD (chronic obstructive pulmonary disease) (H)      Hypertension      Past Surgical History   Past Surgical History:   Procedure Laterality Date     ENDOSCOPY UPPER, COLONOSCOPY, COMBINED  2/12/2014    Procedure: COMBINED ENDOSCOPY UPPER, COLONOSCOPY;  UPPER ENDOSCOPY WITH BIOPSIES  AND COLONOSCOPY WITH POLYPECTOMY;  Surgeon: Reta Gallagher MD;  Location: [facility] OR     EXCISE CYST THYROGLOSSAL DUCT       VASECTOMY       Medications   Home Meds  Prior to Admission medications    Medication Sig Start Date End Date Taking? Authorizing Provider   albuterol (PROAIR HFA, PROVENTIL HFA, VENTOLIN HFA) 108 (90 BASE) MCG/ACT inhaler Inhale 2 puffs into the lungs every 4 hours as needed for shortness of breath / dyspnea 9/11/13   Reggie Abraham MD   amLODIPine (NORVASC) 5 MG tablet Take 1 tablet by mouth daily. 7/20/13   Lucia Membreno MD   aspirin  MG tablet Take 1 tablet (325 mg) by mouth daily 2/13/14   Cj Perry DO   BENAZEPRIL HCL PO Take 40 mg by mouth daily.    Reported, Patient   fluticasone-salmeterol (ADVAIR) 250-50 MCG/DOSE diskus inhaler Inhale 1 puff into the lungs every 12 hours. 7/20/13   Lucia Membreno MD   mupirocin (BACTROBAN) 2 % cream Apply topically 3 times daily 9/11/13   Reggie Abraham MD   nicotine (NICODERM CQ) 21 MG/24HR patch 2h hr Place 1 patch onto the skin daily. 7/20/13   Lucia Membreno MD   simvastatin (ZOCOR) 10 MG tablet Take 1 tablet by mouth At Bedtime. 7/20/13   Lucia Membreno MD   tiotropium (SPIRIVA) 18 MCG inhalation capsule Inhale 1 capsule into the lungs daily. 7/20/13   Lucia Membreno MD       Scheduled Meds      Infusion Meds      PRN Meds      Allergies   Allergies   Allergen Reactions     Doxazosin      Light sensitivity     Hydrochlorothiazide      Renal insufficiency     Penicillins      Family History   Family History   Problem Relation  Age of Onset     Cancer - colorectal Mother      Cancer Sister 79        unknown primary     Social History   Social History     Tobacco Use     Smoking status: Former Smoker     Packs/day: 0.50     Years: 60.00     Pack years: 30.00     Last attempt to quit: 2013     Years since quittin.4     Smokeless tobacco: Former User   Substance Use Topics     Alcohol use: No     Drug use: No       Review of Systems   Review of systems was not needed on this patient due to critical condition       PHYSICAL EXAMINATION  Temp:  [96.7  F (35.9  C)-96.8  F (36  C)] 96.8  F (36  C)  Pulse:  [76-94] 79  Heart Rate:  [75-96] 75  Resp:  [16-17] 16  BP: (125-176)/() 145/88  SpO2:  [93 %-97 %] 95 %     Neuro:  Mental status: Awake, sleepy.  Is able to attend to nursing and family.  Has right gaze preference and neglect of left which he is able to overcome to some degree, however, is unaware of his deficits and unable to recognize his left arm. Speech is fluent with mild dysarthria.   Cranial nerves: R gaze preference, but can cross midline.  M left facial droop.  Facial sensation intact, shoulder shrug strong, hard of hearing.  .  Motor: 5/5 strength RUE, 0/5 LUE, 4/5 RLE, 3/5 LLE  Sensory (assessed via nursing): Intact to light touch in face, arms, and legs, absent LUE  Coordination: Finger to nose intact without dysmetria in RUE.  Normal heel-shin test bilaterally RLE.  LLE limited by weakness.  Gait: Deferred      Dysphagia Screen  Per Nursing    Stroke Scales    NIHSS  Interval baseline (20 0962)   Interval Comments     1a. Level of Consciousness 0-->Alert, keenly responsive   1b. LOC Questions 1-->Answers one question correctly   1c. LOC Commands 0-->Performs both tasks correctly   2.   Best Gaze 1-->Partial gaze palsy, gaze is abnormal in one or both eyes, but forced deviation or total gaze paresis is not present   3.   Visual 1-->Partial hemianopia   4.   Facial Palsy 1-->Minor paralysis (flattened  nasolabial fold, asymmetry on smiling)   5a. Motor Arm, Left 4-->No movement   5b. Motor Arm, Right 0-->No drift, limb holds 90 (or 45) degrees for full 10 secs   6a. Motor Leg, Left 2-->Some effort against gravity, leg falls to bed by 5 secs, but has some effort against gravity   6b. Motor Leg, right 1-->Drift, leg falls by the end of the 5-sec period but does not hit bed   7.   Limb Ataxia 0-->Absent   8.   Sensory 1-->Mild-to-moderate sensory loss, patient feels pinprick is less sharp or is dull on the affected side, or there is a loss of superficial pain with pinprick, but patient is aware of being touched   9.   Best Language 0-->No aphasia, normal   10. Dysarthria 1-->Mild-to-moderate dysarthria, patient slurs at least some words and, at worst, can be understood with some difficulty   11. Extinction and Inattention  2-->Profound paige-inattention/extinction more than 1 modality   Total 15 (01/07/20 0933)       Imaging  I personally reviewed all imaging; relevant findings per HPI.     Lab Results Data   CBC  Recent Labs   Lab 01/07/20  0805   WBC 8.3   RBC 4.69   HGB 14.5   HCT 42.9        Basic Metabolic Panel    Recent Labs   Lab 01/07/20  0805      POTASSIUM 4.3   CHLORIDE 112*   CO2 24   BUN 24   CR 1.44*   *   LAWRENCE 8.4*     Liver Panel  Recent Labs   Lab Test 01/07/20  0805 07/19/13  0135   PROTTOTAL 6.5* 7.3   ALBUMIN 3.3* 3.6   BILITOTAL 0.8 0.5   ALKPHOS 84 77   AST 19 16   ALT 34 26     INR  Recent Labs   Lab Test 02/13/14  0540 02/12/14  0540 02/11/14  2320   INR 1.24* 1.32* 1.38*      Lipid Profile  Recent Labs   Lab Test 07/19/13  0139   CHOL 148   HDL 28*      TRIG 101   CHOLHDLRATIO 5.3*     A1CNo lab results found.  Troponin I  Recent Labs   Lab 01/07/20  0805   TROPI <0.015          Stroke Code / Stroke Consult Data Data   Stroke Code Data  (for stroke code with tele)  Stroke code activated 01/07/20 0721   First stroke provider response 01/07/20 0724   Video start  time 01/07/20   0835   Video end time 01/07/20   0900   Last known normal 01/07/20   2200   Time of discovery  (or onset of symptoms)  01/07/20   0630   Head CT read by me 01/07/20   0800   Was stroke code de-escalated? No               Telestroke Service Details  Type of service telemedicine diagnostic assessment of acute neurological changes   Reason telemedicine is appropriate patient requires assessment with a specialist for diagnosis and treatment of neurological symptoms   Mode of transmission secure interactive audio and video communication per Marco A   Originating site (patient location) [facility]    Distant site (provider location) [facility]       I have personally spent a total of 70 minutes providing critical care services supervising this patient's stroke code activation.  I personally reviewed all lab values and radiology images.  I evaluated and directed care for the patient's critical condition.

## 2020-01-07 NOTE — PROGRESS NOTES
Admitted/transferred from: s/p IR  Reason for admission/transfer: stoke and TIMELESS study workup  2 RN skin assessment: completed by Ema Quinonez and Erma Fournier  Result of skin assessment and interventions/actions: L head laceration  Height, weight, drug calc weight: done  Patient belongings (see Flowsheet) none  MDRO education added to care plan: NA  ?

## 2020-01-07 NOTE — PROCEDURES
[facility], [place]     Endovascular Surgical Neuroradiology Post-Procedure Note    Pre-Procedure Diagnosis:  Right ICA and  M1 occlusion.  Post-Procedure Diagnosis: Chronic right ICA occulusion.    Procedure(s):   Endovascular treatment of acute ischemic stroke    - TICI Score: Not applicable     Stroke code:0957.  IR team activated: 0958  Groin puncture: 1043.    Findings:  The right ICA appears to be occluded at the carotid bifurcation.  The left A1 fills the right anterior cerebral artery via the anterior communicating artery.  The right external carotid artery is giving collaterals to the right middle cerebral artery.  The right middle cerebral artery is not adequately opacified.  The posterior circulation appears unremarkable.  Further evaluation of the right MCA could not be performed as it was not accessible.      Plan: Admit to stroke service.    Primary Surgeon:  Dr. Nacho Pritchett  Secondary Surgeon:  Not applicable  Secondary Surgeon Review:  None  Fellow:  Jasen  Additional Assistants:  none    Prior to the start of the procedure and with procedural staff participation, I verbally confirmed: the patient s identity using two indicators, relevant allergies, that the procedure was appropriate and matched the consent or emergent situation, and that the correct equipment/implants were available. Immediately prior to starting the procedure I conducted the Time Out with the procedural staff and re-confirmed the patient s name, procedure, and site/side. (The Joint Commission universal protocol was followed.)  Yes    PRU value: Not applicable    Anesthesia:  Conscious Sedation  Medications:  Fentanyl, Lidocaine, Midazolam  Puncture site:  Right Femoral Artery    Fluoroscopy time (minutes):  30.1  Radiation dose (mGy):  1259    Contrast amount (mL):  75     Estimated blood loss (mL):  Minimal    Closure:  Manual    Disposition:  Will be followed in hospital by the Neuro Critical  Care/Stroke team.        Sedation Post-Procedure Summary    Sedatives: Fentanyl and Midazolam (Versed)    Vital signs and pulse oximetry were monitored and remained stable throughout the procedure, and sedation was maintained until the procedure was complete.  The patient was monitored by staff until sedation discharge criteria were met.    Patient tolerance:  Patient tolerated the procedure well with no immediate complications.    Time of sedation in minutes:  75 minutes from beginning to end of physician one to one monitoring.    Toy Aggarwal MD  Pager:  [phone].

## 2020-01-07 NOTE — ED PROVIDER NOTES
History     Chief Complaint   Patient presents with     CVA     The history is provided by the patient, the spouse and the EMS personnel.     Efe Cardona is a 84 year old male who brought for fall and collapse at kitchen about 6:30 AM . As per wife he walked to kitchen himself and then wife heard him fell on kitchen floor. Pt has old strokes with mild R arm and L leg weakness but normally can walk with slight draging of his leg and no limping and his R arm is functional . As per wife he had some leg weakness last night , wife noticed draging his leg last night( not sure which leg), pt related his last night symptoms to hip pain.    Code stroke activated .     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 Provider to review and confirm.          Past Medical History:    Past Medical History:   Diagnosis Date     Atheroma 8/14/13     BPH (benign prostatic hyperplasia)      Cerebral embolism with cerebral infarction (H) 7/2013     COPD (chronic obstructive pulmonary disease) (H)      Hypertension        Past Surgical History:    Past Surgical History:   Procedure Laterality Date     ENDOSCOPY UPPER, COLONOSCOPY, COMBINED  2/12/2014    Procedure: COMBINED ENDOSCOPY UPPER, COLONOSCOPY;  UPPER ENDOSCOPY WITH  BIOPSIES  AND COLONOSCOPY WITH POLYPECTOMY;  Surgeon: Reta Gallagher MD;  Location: [facility] OR     EXCISE CYST THYROGLOSSAL DUCT       VASECTOMY         Family History:    Family History   Problem Relation Age of Onset     Cancer - colorectal Mother      Cancer Sister 79        unknown primary       Social History:  Marital Status:   [2]  Social History     Tobacco Use     Smoking status: Former Smoker     Packs/day: 0.50     Years: 60.00     Pack years: 30.00     Last attempt to quit: 2013     Years since quittin.4     Smokeless tobacco: Former User   Substance Use Topics     Alcohol use: No     Drug use: No        Medications:    No current outpatient medications on file.        Review of Systems   Unable to perform ROS: Mental status change       Physical Exam   BP: (!) 176/130  Pulse: 92  Heart Rate: 96  Temp: 96.7  F (35.9  C)  Resp: 17  Weight: 85.7 kg (188 lb 15 oz)  SpO2: 97 %      Physical Exam  Vitals signs and nursing note reviewed.   Constitutional:       Appearance: He is well-developed.   HENT:      Head: Normocephalic and atraumatic.   Eyes:      Conjunctiva/sclera: Conjunctivae normal.      Pupils: Pupils are equal, round, and reactive to light.   Neck:      Musculoskeletal: Normal range of motion and neck supple.      Thyroid: No thyromegaly.      Vascular: No JVD.      Trachea: No tracheal deviation.   Cardiovascular:      Rate and Rhythm: Normal rate and regular rhythm.      Heart sounds: Normal heart sounds. No murmur. No gallop.    Pulmonary:      Effort: Pulmonary effort is normal. No respiratory distress.      Breath sounds: Normal breath sounds. No stridor. No wheezing or rales.   Chest:      Chest wall: No tenderness.   Abdominal:      General: Bowel sounds are normal. There is no distension.      Palpations: Abdomen is soft. There is no mass.      Tenderness: There is no abdominal tenderness. There is no guarding or rebound.   Musculoskeletal: Normal range of motion.          General: No tenderness.   Lymphadenopathy:      Cervical: No cervical adenopathy.   Skin:     General: Skin is warm.      Coloration: Skin is not pale.      Findings: No erythema or rash.   Neurological:      Mental Status: He is lethargic.      GCS: GCS eye subscore is 3. GCS verbal subscore is 4. GCS motor subscore is 6.      Cranial Nerves: Cranial nerve deficit, dysarthria and facial asymmetry present.      Sensory: Sensory deficit present.      Motor: Weakness present.      Deep Tendon Reflexes: Babinski sign present on the left side.   Psychiatric:         Behavior: Behavior normal.         ED Course        Procedures            The patient has stroke symptoms:           ED Stroke specific documentation           NIHSS PDF          Protocol PDF     Patient last known well time: 6:30 am  ED Provider first to bedside at: 7:30  CT Results received at: 7:44 am    tPA:   Not given due to outside the time window.    If treating with tPA: Ensure SBP<185 and DBP<105 prior to treatment with IV tPA.  Administering IV tPA after treatment with IV labetalol, hydralazine, or nicardipine is reasonable once BP control is established.      [employer] Stroke Scale (Baseline)  Time Performed: 7:30 am      Score    Level of consciousness: (1)   Not alert; arousable w/ minor stim to obey/answer/respond    LOC questions: (1)   Answers one question correctly    LOC commands: (2)   Performs neither task correctly    Best gaze: (0)   Normal    Visual: (0)   No visual loss    Facial palsy: (2)   Partial paralysis (total/near total of lower face)    Motor arm (left): (3)   No effort against gravity    Motor arm (right): (0)   No drift    Motor leg (left): (2)   Some effort against gravity    Motor leg (right): (0)   No drift    Limb ataxia: (1)   Present in one limb    Sensory: (1)   Mild to moderate sensory loss    Best language: (0)   Normal- no aphasia    Dysarthria: (1)   Mild to moderate dysarthria    Extinction  and inattention: (1)          Total Score: 15        Stroke Mimics were considered (including migraine headache, seizure disorder, hypoglycemia (or hyperglycemia), head or spinal trauma, CNS infection, Toxin ingestion and shock state (e.g. sepsis) .                       No results found for this or any previous visit (from the past 24 hour(s)).    Medications   iopamidol (ISOVUE-370) solution 50 mL (50 mLs Intravenous Given 1/7/20 0743)   sodium chloride (PF) 0.9% PF flush 100 mL (100 mLs Intravenous Given 1/7/20 0743)       Assessments & Plan (with Medical Decision Making)   Left side weakness at about 6:30 am with questionable some weakness that started last night  Code  Stroke activated  CT head: old bilateral stroke, otherwise normal  CTA head neck  Spoke to Dr Cameron , stroke neurologist, Tele neuro performed, Dr Cameron spoke to family and patient at bedside, recommended transfer to [facility] with no TPA due to questionable time of symptoms onset  Family agreed with transfer     I have reviewed the nursing notes.    I have reviewed the findings, diagnosis, plan and need for follow up with the patient.      Discharge Medication List as of 1/7/2020  9:33 AM          Final diagnoses:   Cerebrovascular accident (CVA) due to occlusion of other precerebral artery (H)       1/7/2020   [facility] EMERGENCY [facility]PART[facility]     Roshan Mancini MD  01/08/20 2142       Roshan Mancini MD  01/15/20 0037       Roshan Mancini MD  01/15/20 8064

## 2020-01-07 NOTE — PROGRESS NOTES
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  He presented to an outside hospital earlier today after he fell and collapsed in his kitchen about 6:30 AM.  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  Patient was airlifted to the [facility] for further management of his stroke.  TPA was not given as patient was outside the window.  He was considered a potential candidate for the TIMELESS trial but ruled out upon CT perfusion being performed here.    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    source:  aortic atheroma     COPD (chronic obstructive pulmonary disease) (H)      Hypertension        Surgical History:  Past Surgical History:   Procedure Laterality Date     ENDOSCOPY UPPER, COLONOSCOPY, COMBINED  2/12/2014    Procedure: COMBINED ENDOSCOPY UPPER, COLONOSCOPY;  UPPER ENDOSCOPY WITH BIOPSIES  AND COLONOSCOPY WITH POLYPECTOMY;  Surgeon: Reta Gallagher MD;  Location: [facility]     EXCISE CYST THYROGLOSSAL DUCT       VASECTOMY         Family History:  Family History   Problem Relation Age of Onset     Cancer - colorectal Mother      Cancer Sister 79        unknown primary       Social History:  Social History     Socioeconomic History     Marital status:      Spouse name: Not on file     Number of children: Not on file     Years of education: Not on file     Highest education level: Not on file   Occupational History     Not on file   Social Needs     Financial resource strain: Not on file     Food insecurity:      Worry: Not on file     Inability: Not on file     Transportation needs:     Medical: Not on file     Non-medical: Not on file   Tobacco Use     Smoking status: Former Smoker     Packs/day: 0.50     Years: 60.00     Pack years: 30.00     Last attempt to quit: 2013     Years since quittin.4     Smokeless tobacco: Former User   Substance and Sexual Activity     Alcohol use: No     Drug use: No     Sexual activity: Not on file   Lifestyle     Physical activity:     Days per week: Not on file     Minutes per session: Not on file     Stress: Not on file   Relationships     Social connections:     Talks on phone: Not on file     Gets together: Not on file     Attends Mosque service: Not on file     Active member of club or organization: Not on file     Attends meetings of clubs or organizations: Not on file     Relationship status: Not on file     Intimate partner violence:     Fear of current or ex partner: Not on file     Emotionally abused: Not on file     Physically abused: Not on file     Forced sexual activity: Not on file   Other Topics Concern     Not on file   Social History Narrative     Not on file       Allergies:  Allergies   Allergen Reactions     Doxazosin      Light sensitivity     Hydrochlorothiazide      Renal insufficiency     Penicillins        Is there a contrast allergy?  No    Medications:  Current Facility-Administered Medications   Medication     hydrALAZINE (APRESOLINE) injection 10 mg     labetalol (NORMODYNE/TRANDATE) syringe 10 mg     magnesium sulfate 4 g in 100 mL sterile water (premade)     naloxone (NARCAN) injection 0.1-0.4 mg     polyethylene glycol (MIRALAX/GLYCOLAX) Packet 17 g     potassium chloride (KLOR-CON) Packet 20-40 mEq     potassium chloride 10 mEq in 100 mL intermittent infusion with 10 mg lidocaine     potassium chloride 10 mEq in 100 mL sterile water intermittent infusion (premix)     potassium chloride 20 mEq in 50 mL intermittent infusion     potassium chloride  ER (K-DUR/KLOR-CON M) CR tablet 20-40 mEq     potassium phosphate 15 mmol in sodium chloride 0.9 % 250 mL intermittent infusion     potassium phosphate 20 mmol in sodium chloride 0.9 % 250 mL intermittent infusion     potassium phosphate 20 mmol in sodium chloride 0.9 % 500 mL intermittent infusion     potassium phosphate 25 mmol in sodium chloride 0.9 % 500 mL intermittent infusion     senna-docusate (SENOKOT-S/PERICOLACE) 8.6-50 MG per tablet 1-2 tablet   .    ROS:  Review of systems not obtained due to patient factors - critical condition    PHYSICAL EXAMINATION  Vital Signs:  B/P: 171/96,  T: 97.7,  P: Data Unavailable,  R: 19    Cardio:  RRR  Pulmonary:  no respiratory distress  Abdomen:  soft    Neurologic  Mental Status:  Alert and oriented to time place and person.  Cranial Nerves:  visual fields intact, left facial droop. tongue midline.  gaze not crossing midline  Motor:  no abnormal movements, normal muscle bulk, bilateral upper are normal drift in left lower.  Sensory:  left hemiparesis. right normal.  Coordination:  right FnF intact.    Pre-procedure National Institutes of Health Stroke Scale:      Score    Level of consciousness: (0)   Alert, keenly responsive    LOC questions: (0)   Answers both questions correctly    LOC commands: (0)   Performs both tasks correctly    Best gaze: (1)   Partial gaze palsy    Visual: (0)   No visual loss    Facial palsy: (1)   Minor paralysis (flat nasolabial fold, smile asymmetry)    Motor arm (left): (0)   No drift    Motor arm (right): (0)   No drift    Motor leg (left): (1)   Drift    Motor leg (right): (0)   No drift    Limb ataxia: (0)   Absent    Sensory: (2)   Severe to total sensory loss    Best language: (0)   Normal- no aphasia    Dysarthria: (1)   Mild to moderate dysarthria    Extinction and inattention: (2)   Profound paige-inattention / extinction > one modality        Total Score:  7       LABS  (most recent Cr, BUN, GFR, PLT, INR, PTT within the past 7  days):  Recent Labs   Lab 01/07/20  1005 01/07/20  0805   CR  --  1.44*   BUN  --  24   GFRESTIMATED 45* 44*   GFRESTBLACK 54* 51*   PLT  --  197   INR  --  1.10        Platelet Function P2Y12 (PRU):  Not applicable      ASSESSMENT:    PLAN:        PRE-PROCEDURE SEDATION ASSESSMENT     Pre-Procedure Sedation Assessment done at 1000.    Expected Level:  Moderate Sedation    Indication:  Sedation is required to allow for neurointerventional procedure.    Consent obtained from spouse after discussing the risks, benefits and alternatives.     PO Intake:  Not NPO but emergent condition outweighs risk.    ASA Class:  Class 3 - SEVERE SYSTEMIC DISEASE, DEFINITE FUNCTIONAL LIMITATIONS.    Mallampati:  Grade 1:  Soft palate, uvula, tonsillar pillars, and posterior pharyngeal wall visible    History and physical reviewed and no updates needed. I have reviewed the lab findings, diagnostic data, medications, and the plan for sedation. I have determined this patient to be an appropriate candidate for the planned sedation and procedure and have reassessed the patient IMMEDIATELY PRIOR to sedation and procedure.    Patient was discussed with the Attending, Dr. Pritchett, who agrees with the plan.    Toy Aggarwal MD   Pager: [phone].

## 2020-01-07 NOTE — PROGRESS NOTES
"Trial: A PHASE III, PROSPECTIVE, DOUBLE-BLIND,  RANDOMIZED, PLACEBO-CONTROLLED TRIAL  OF THROMBOLYSIS IN  IMAGING-ELIGIBLE, LATE-WINDOW PATIENTS  TO ASSESS THE EFFICACY AND SAFETY OF  IV TENECTEPLASE (The TIMELESS Trial)    Participation or LAR/Surrogate Prospective Consent     IRB Number: [id number]    PI: Papo Cameron [phone]    Primary Study Coordinators:  Johan Tse ([email or web address]) and Anastasiya Francisco ([email or web address])     24/7 On-Call  is available at [phone] \"Request a NETT coordinator via Zip-It Page\"    Estimated duration of study: 3 months (90 days)    Pt was screened and consented for TIMELESS trial by note author. His wife, daugther, and son in law were present and his wife ALEXIS was used as a surrogate due to the patient's medical condition. The consent discussion lasted approximately 20 minutes. Potential benefits, risks, and study procedures were discussed. Treatment options such as standard of care and study specific events were discussed. An opportunity for questions was provided.  The family was interested in standard of care and why he was not eligible for standard clot buster.  I explained that he was a wake-up stroke and that his last known normal time was actually > 4.5 hours.      Consent was obtained electronically and signed by the LAR.  A copy of the consent was given to the son-in law via email through [employer] at the time of consenting    At time of screening the investigators determined the modified [place] (mRS) score to be 2.  This was determined after extensive discussion due to the patient's prior strokes.  They report that he does not stay home for extended periods (days) alone and that his wife typically assists him throughout the day, but this has always been their family roles.  They note that he does stay home alone and drive to his own appointments independently throughout the day.  When asked if the wife worked from 9-5 would the patient be " able to take care of himself consistently during this time, they all agreed that he could.  He has minor residual left leg weakness, but walks independently without a walker or cane.    For researcher contact info, please see the Research Studies tab in EPIC.    Papo Cameron MD, MS  Neurology    Please contact the stroke service with any questions: link to Text page

## 2020-01-07 NOTE — ED PROVIDER NOTES
"    [place] EMERGENCY DEPARTMENT ([facility])  1/07/20 ED 2 9:55 AM   History   No chief complaint on file.    The history is provided by the patient and medical records.     Efe Cardona is a 84 year old male with history of prior bilateral CVA with residual right arm and left weakness who presents via [facility] for further evaluation and management of stroke. Patient states that when he went to bed last night he had to walk carefully because his hip was bothering him due to discomfort. He went to bed at 10 PM. When he woke up today he felt as if his left arm and leg were weak. This left-sided weakness is new. He was able to stand but the weakness persisted with left sided mouth drooping. He fell in his kitchen today at 6:30 AM prompting EMS call.  Wife called 911 and he was seen at [facility] and stroke code was called. 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 The right internal carotid artery reconstitutes at the level the carotid siphon via collaterals. Poor filling of the middle cerebral artery branches on the right is seen.\"    He was sent here via air. Air medics note that his left side was \"flaccid\" otherwise no changes en route. Patient remained alert and oriented. He was hard of hearing but able to answer questions appropriately when able to hear questions. Medics did note that  his lung sounds were coarse and crackly. Blood pressures remained stable with systolic pressures in the 150-170s. Patient has not had any aspirin today. No chest pain or shortness of breath. No headache. No nausea. No changes in vision. Blood sugar was 122. Family en route.     I have reviewed the Medications, Allergies, Past Medical and Surgical History, and Social History in the [employer] system.  PAST MEDICAL HISTORY:   Past Medical History:   Diagnosis Date     Atheroma 8/14/13    severe, of aortic arch and descending aorta     BPH (benign " prostatic hyperplasia)      Cerebral embolism with cerebral infarction (H) 2013    source:  aortic atheroma     COPD (chronic obstructive pulmonary disease) (H)      Hypertension        PAST SURGICAL HISTORY:   Past Surgical History:   Procedure Laterality Date     ENDOSCOPY UPPER, COLONOSCOPY, COMBINED  2014    Procedure: COMBINED ENDOSCOPY UPPER, COLONOSCOPY;  UPPER ENDOSCOPY WITH BIOPSIES  AND COLONOSCOPY WITH POLYPECTOMY;  Surgeon: Reta Gallagher MD;  Location: [facility]     EXCISE CYST THYROGLOSSAL DUCT       VASECTOMY         FAMILY HISTORY:   Family History   Problem Relation Age of Onset     Cancer - colorectal Mother      Cancer Sister 79        unknown primary       SOCIAL HISTORY:   Social History     Tobacco Use     Smoking status: Former Smoker     Packs/day: 0.50     Years: 60.00     Pack years: 30.00     Last attempt to quit: 2013     Years since quittin.4     Smokeless tobacco: Former User   Substance Use Topics     Alcohol use: No       Patient's Medications   New Prescriptions    No medications on file   Previous Medications    ALBUTEROL (PROAIR HFA, PROVENTIL HFA, VENTOLIN HFA) 108 (90 BASE) MCG/ACT INHALER    Inhale 2 puffs into the lungs every 4 hours as needed for shortness of breath / dyspnea    AMLODIPINE (NORVASC) 5 MG TABLET    Take 1 tablet by mouth daily.    ASPIRIN  MG TABLET    Take 1 tablet (325 mg) by mouth daily    BENAZEPRIL HCL PO    Take 40 mg by mouth daily.    FLUTICASONE-SALMETEROL (ADVAIR) 250-50 MCG/DOSE DISKUS INHALER    Inhale 1 puff into the lungs every 12 hours.    MUPIROCIN (BACTROBAN) 2 % CREAM    Apply topically 3 times daily    NICOTINE (NICODERM CQ) 21 MG/24HR PATCH 2H HR    Place 1 patch onto the skin daily.    SIMVASTATIN (ZOCOR) 10 MG TABLET    Take 1 tablet by mouth At Bedtime.    TIOTROPIUM (SPIRIVA) 18 MCG INHALATION CAPSULE    Inhale 1 capsule into the lungs daily.   Modified Medications    No medications on file   Discontinued Medications     No medications on file          Allergies   Allergen Reactions     Doxazosin      Light sensitivity     Hydrochlorothiazide      Renal insufficiency     Penicillins       Review of Systems   Constitutional: Negative for fever.   Respiratory: Negative for shortness of breath.    Cardiovascular: Negative for chest pain.   Gastrointestinal: Negative for abdominal pain.   Neurological: Positive for facial asymmetry and weakness. Negative for speech difficulty.   All other systems reviewed and are negative.      Physical Exam      BP (!) 158/76 (BP Location: Right arm)   Pulse 89   Temp 97.9  F (36.6  C) (Axillary)   Resp 19   Wt 87.3 kg (192 lb 8 oz)   SpO2 94%   BMI 31.07 kg/m        Physical Exam  General: patient is alert and oriented and in no acute distress   Head: atraumatic and normocephalic   EENT: moist mucus membranes, pupils 3mm equal round and reactive, extraocular movements intact  Neck: supple with full range of motion  Cardiovascular: regular rate and rhythm, extremities warm and well perfused, trace bilateral lower extremity edema  Pulmonary: lungs clear to auscultation bilaterally   Abdomen: soft, non-tender, nondistended  Musculoskeletal: normal range of motion of the extremity without gross deformity, no point bony tenderness to palpation of the hips, upper or lower extremities  Neurological: alert and oriented, slight left facial droop, no drift in RUE, RLE, LUE, drift in LLE, decreased sensation in LLE, slurring of speech  Skin: warm, dry       ED Course        Procedures             Critical Care time:  was 30 minutes for this patient excluding procedures.     The patient has stroke symptoms:           ED Stroke specific documentation           NIHSS PDF          Protocol PDF     Patient last known well time: 10pm 01/06/20  ED Provider first to bedside at: 0958  CT Results received at: 1030    tPA:   Not given due to outside the time window.    If treating with tPA: Ensure SBP<185 and  DBP<105 prior to treatment with IV tPA.  Administering IV tPA after treatment with IV labetalol, hydralazine, or nicardipine is reasonable once BP control is established.      National Institutes of Health Stroke Scale (Baseline)  Time Performed: 1005      Score    Level of consciousness: (0)   Alert, keenly responsive    LOC questions: (0)   Answers both questions correctly    LOC commands: (0)   Performs both tasks correctly    Best gaze: (0)   Normal    Visual: (0)   No visual loss    Facial palsy: (1)   Minor paralysis (flat nasolabial fold, smile asymmetry)    Motor arm (left): (0)   No drift    Motor arm (right): (1)   Drift    Motor leg (left): (0)   No drift    Motor leg (right): (0)   No drift    Limb ataxia: (0)   Absent    Sensory: (1)   Mild to moderate sensory loss    Best language: (0)   Normal- no aphasia    Dysarthria: (1)   Mild to moderate dysarthria    Extinction and inattention: (0)   No abnormality        Total Score:  4        Stroke Mimics were considered (including migraine headache, seizure disorder, hypoglycemia (or hyperglycemia), head or spinal trauma, CNS infection, Toxin ingestion and shock state (e.g. sepsis) .         Labs Ordered and Resulted from Time of ED Arrival Up to the Time of Departure from the ED - No data to display         Assessments & Plan (with Medical Decision Making)   Mr. Cardona is a 84 year old male with history of prior bilateral CVA with residual right arm and left weakness who presents via [facility] for further evaluation and management of stroke.  Upon arrival to the emergency department he is moderately hypertensive with a blood pressure of 158/78, otherwise in no respiratory distress and afebrile.  On reevaluation his symptoms do seem to have significantly improved as he has just a slight drift in the left leg without drift in the left arm.  He is able to flex and extend at both hips and does not have any point bony tenderness to palpation.  He denies any  pain or injury following his fall this morning. He was seen and evaluated by neurology and taken for CTA and CTP as well as plan for admission to the neuro ICU for continued stroke management.      I have reviewed the nursing notes.    I have reviewed the findings, diagnosis, plan and need for follow up with the patient.    Current Discharge Medication List          Final diagnoses:   Cerebrovascular accident (CVA), unspecified mechanism (H)   I, Stephanie Cruz, am serving as a trained medical scribe to document services personally performed by Lissa Gregory MD based on the provider's statements to me on January 7, 2020.  This document has been checked and approved by the attending provider.    I, Lissa Gregory MD, was physically present and have reviewed and verified the accuracy of this note documented by Stephanie Cruz, medical scribe.       1/7/2020   [facility], EMERGENCY DEPARTMENT     Lissa Gregory MD  01/07/20 0727

## 2020-01-07 NOTE — ED NOTES
"Pt states he didn't have left arm last night, reports he was having left sided hip pain, which he took aleve for and provided relief. Family states he was \"dragging his leg.\" Pt denies pain. Remains alert and orientated. Family at bedside.   "

## 2020-01-07 NOTE — PLAN OF CARE
Discharge Planner SLP   Patient plan for discharge: not known  Current status: Clinical dysphagia examination completed per MD order. The patient was on strict bedrest and unable to sit fully upright, but he was positioned by RN in reverse trendelenburg to be as upright as possible for participation in this exam. The patient presents with moderate left facial droop. Left neglect strongly suspected as well.  The patient was able to tolerate small single sips of thin liquid by spoon and straw and small bites of applesauce without overt signs/symptoms of aspiration. Advanced solids not served due to left oral weakness and positioning restriction. Recommend the patient initiate one small sip at a time with a thin consistency full liquid diet when as upright as possible. SLP will plan to follow up for diet tolerance and to assess potential for further diet advancement tomorrow when able to sit upright.  Barriers to return to prior living situation: left weakness/neglect, medical status, dysphagia  Recommendations for discharge: inpatient rehab  Rationale for recommendations: SLP tx for below baseline oropharyngeal swallowing skills, currently requires modified diet for safety       Entered by: Toya Sanchez 01/07/2020 4:17 PM

## 2020-01-07 NOTE — PHARMACY
Pharmacist responded as part of the Stroke Code Team activation to patient care area ED.  The Stroke Team determined that the patient was being considered as candidate for the TIMELESS STUDY and the pharmacy team was dismissed at 1010.

## 2020-01-07 NOTE — DISCHARGE INSTRUCTIONS
What to expect when you have contrast    During your exam, we will inject  contrast  into your vein or artery. (Contrast is a clear liquid with iodine in it. It shows up on X-rays.)    You may feel warm or hot. You may have a metal taste in your mouth and a slight upset stomach. You may also feel pressure near the kidneys and bladder. These effects will last about 1 to 3 minutes.    Please tell us if you have:    Sneezing     Itching    Hives     Swelling in the face    A hoarse voice    Breathing problems    Other new symptoms    Serious problems are rare.  They may include:    Irregular heartbeat     Seizures    Kidney failure              Tissue damage    Shock      Death    If you have any problems during the exam, we  will treat them right away.    When you get home    Call your hospital if you have any new symptoms in the next 2 days, like hives or swelling. (Phone numbers are at the bottom of this page.) Or call your family doctor.     If you have wheezing or trouble breathing, call 911.    Self-care  -Drink at least 4 extra glasses of water today.   This reduces the stress on your kidneys.  -Keep taking your regular medicines.    The contrast will pass out of your body in your  Urine(pee). This will happen in the next 24 hours. You  will not feel this. Your urine will not  change color.    If you have kidney problems or take metformin    Drink 4 to 8 large glasses of water for the next  2 days, if you are not on a fluid restriction.    ?If you take metformin (Glucophage or Glucovance) for diabetes, keep taking it.      ?Your kidney function tests are abnormal.  If you take Metformin, do not take it for 48 hours. Please go to your clinic for a blood test within 3 days after your exam before the restarting this medicine.     (Note to provider:please give patient prescription for lab tests.)    ?Special instructions: -    I have read and understand the above information.    Patient Sign  Here:______________________________________Date:________Time:______    Staff Sign Here:________________________________________Date:_______Time:______      Radiology Departments:     ?[facility]: [phone] ?[place]: [phone]     ?[place]: [phone] ?[place]:[phone]      ?[place]: [phone]  ?[place]: [phone]  ?[place]:[phone]    ?[facility] [place]:[phone]  ?[facility] [place]:[phone]

## 2020-01-07 NOTE — ED NOTES
[facility] called over scanner to report positive FAST and to activate code stroke at 0719. Code stroke called overhead at 0719.

## 2020-01-08 NOTE — PROGRESS NOTES
[facility], [place]    Stroke Progress Note    Interval Events  - No acute event overnight  - -153  - On 2 lit O2 nasal with 92%    Summary  Efe Cardona is a 84 year old male history of prior stroke (per family secondary to aortic atheroma), BPH, tobacco/COPD who presents  who presents with acute onset L hemiparesis and neglect concerning for right MCA territory ischemic infarction.    Impression  Ischemic Stroke due to large-artery atherosclerosis (embolus/thrombosis)    Plan  # Acute large right MCA territory ischemic stroke due to large artery atherosclerosis  - Unable to do thrombectomy  - Neurochecks Q 4 hour  - Labetalol and Hydralazine PRN for SBP > 140   - Telemetry, EKG  - Bedside Glucose Monitoring  - A1c 5.7  -  Daily  - Change Simvastatin 10 to Pravastatin 10 due to low LDL (27)  - PT/OT/SLP   - Stroke Education  - Depression Screen  - Apnea Screen  - Euthermia, Euglycemia     #COPD  - resume PTA abuterol q4h PRN  - continuous pulse ox monitoring   - maintain O2 saturations > 88%       #Hypertension  - Restart PTA Amlodipine 5 mg  - Labetalol and Hydralazine PRN for SBP > 140      #HLD  - Change Simvastatin 10 to Pravastatin 10 due to low LDL (27)    FEN: Dysphagia diet level 1  PPX:    DVT prophylaxis: SCDs, heparin subcutaneous 5000 q8h     GI: Not indicated  Code Status: Full, presumed  Dispo: 6A   _____________________________________________________    Medications   Home Meds  Prior to Admission medications    Medication Sig Start Date End Date Taking? Authorizing Provider   albuterol (PROAIR HFA, PROVENTIL HFA, VENTOLIN HFA) 108 (90 BASE) MCG/ACT inhaler Inhale 2 puffs into the lungs every 4 hours as needed for shortness of breath / dyspnea 9/11/13   Reggie Abraham MD   amLODIPine (NORVASC) 5 MG tablet Take 1 tablet by mouth daily. 7/20/13   Lucia Membreno MD   aspirin  MG tablet Take 1 tablet (325 mg) by mouth daily 2/13/14    Cj Perry, DO   BENAZEPRIL HCL PO Take 40 mg by mouth daily.    Reported, Patient   fluticasone-salmeterol (ADVAIR) 250-50 MCG/DOSE diskus inhaler Inhale 1 puff into the lungs every 12 hours. 7/20/13   Lucia Membreno MD   mupirocin (BACTROBAN) 2 % cream Apply topically 3 times daily 9/11/13   Reggie Abraham MD   nicotine (NICODERM CQ) 21 MG/24HR patch 2h hr Place 1 patch onto the skin daily. 7/20/13   Lucia Membreno MD   simvastatin (ZOCOR) 10 MG tablet Take 1 tablet by mouth At Bedtime. 7/20/13   Lucia Membreno MD   tiotropium (SPIRIVA) 18 MCG inhalation capsule Inhale 1 capsule into the lungs daily. 7/20/13   Lucia Membreno MD       Scheduled Meds    fluticasone-vilanterol  1 puff Inhalation Daily     heparin ANTICOAGULANT  5,000 Units Subcutaneous Q8H     ipratropium - albuterol 0.5 mg/2.5 mg/3 mL  3 mL Nebulization 4x daily     nicotine  1 patch Transdermal Daily     nicotine   Transdermal Q8H     umeclidinium  1 puff Inhalation Daily       Infusion Meds    sodium chloride 75 mL/hr at 01/07/20 1600       PRN Meds  albuterol, glucose **OR** dextrose **OR** glucagon, hydrALAZINE, labetalol, magnesium sulfate, metoclopramide **OR** metoclopramide, naloxone, ondansetron **OR** ondansetron, polyethylene glycol, potassium chloride, potassium chloride with lidocaine, potassium chloride, potassium chloride, potassium chloride, potassium phosphate (KPHOS) in D5W IV, potassium phosphate (KPHOS) in D5W IV, potassium phosphate (KPHOS) in D5W IV, potassium phosphate (KPHOS) in D5W IV, prochlorperazine **OR** prochlorperazine **OR** prochlorperazine, senna-docusate       PHYSICAL EXAMINATION  Temp:  [96.7  F (35.9  C)-98.5  F (36.9  C)] 98.5  F (36.9  C)  Pulse:  [] 78  Heart Rate:  [74-96] 77  Resp:  [10-30] 18  BP: (125-203)/() 140/76  SpO2:  [91 %-100 %] 97 %     General:  patient lying in bed without any acute distress    HEENT:  normocephalic/atraumatic  Cardio:   RRR  Pulmonary:  no respiratory distress  Abdomen:  soft  Extremities:  no edema  Skin:  intact        Stroke Scales  National Institutes of Health Stroke Scale  Exam Interval: 24 hours post onset of symptom +/- 20 minutes   Score    Level of consciousness: (0)   Alert, keenly responsive    LOC questions: (0)   Answers both questions correctly    LOC commands: (0)   Performs both tasks correctly    Best gaze: (0)   Normal    Visual: (2)   Complete hemianopia    Facial palsy: (2)   Partial paralysis (total/near total of lower face)    Motor arm (left): (2)   Some effort against gravity    Motor arm (right): (0)   No drift    Motor leg (left): (2)   Some effort against gravity    Motor leg (right): (0)   No drift    Limb ataxia: (0)   Absent    Sensory: (2)   Severe to total sensory loss    Best language: (0)   Normal- no aphasia    Dysarthria: (1)   Mild to moderate dysarthria    Extinction and inattention: (2)   Profound paige-inattention / extinction > one modality        Total Score:  13       Imaging  I personally reviewed all imaging; relevant findings per HPI.     Lab Results Data   CBC  Recent Labs   Lab 01/08/20  0410 01/07/20  1649 01/07/20  1004 01/07/20  0805   WBC 10.1  --  12.8* 8.3   RBC 4.39*  --  4.85 4.69   HGB 13.6  --  14.8 14.5   HCT 41.1  --  45.5 42.9    208 221 197     Basic Metabolic Panel    Recent Labs   Lab 01/08/20  0410 01/07/20  1004 01/07/20  0805    142 140   POTASSIUM 4.2 4.4 4.3   CHLORIDE 112* 110* 112*   CO2 24 26 24   BUN 21 25 24   CR 1.39* 1.47* 1.44*   GLC 96 117* 125*   LAWRENCE 8.4* 8.8 8.4*     Liver Panel  Recent Labs   Lab Test 01/07/20  0805 07/19/13  0135   PROTTOTAL 6.5* 7.3   ALBUMIN 3.3* 3.6   BILITOTAL 0.8 0.5   ALKPHOS 84 77   AST 19 16   ALT 34 26     INR  Recent Labs   Lab Test 01/07/20  1004 01/07/20  0805 02/13/14  0540   INR 1.05 1.10 1.24*      Lipid Profile  Recent Labs   Lab Test 01/08/20  0410 07/19/13  0139   CHOL 71 148   HDL 28* 28*   LDL 27 100    TRIG 80 101   CHOLHDLRATIO  --  5.3*     A1C  Recent Labs   Lab Test 01/07/20  1245   A1C 5.7*     Troponin I  Recent Labs   Lab 01/07/20  1004 01/07/20  0805   TROPI <0.015 <0.015

## 2020-01-08 NOTE — PLAN OF CARE
Discharge Planner SLP   Patient plan for discharge: unknown  Current status: Recommend dysphagia diet level 1 with thin liquids as tolerated with supervision/assist. Pt to sit upright for all PO intake, take small bites/sips and alternate consistencies. Check for pocketing on left. Pt with poor oral sensation on left and unable to functionally manipulate soft solid bolus trial this date; majority of bolus lost/removed anteriorly.   Barriers to return to prior living situation: dysphagia; medical status  Recommendations for discharge: inpatient rehab  Rationale for recommendations: Pt is below baseline function; anticipate multidisciplinary rehab needs post discharge       Entered by: Eve Lala 01/08/2020 10:28 AM

## 2020-01-08 NOTE — PROGRESS NOTES
01/08/20 1421   Quick Adds   Type of Visit Initial Occupational Therapy Evaluation   Living Environment   Lives With spouse   Living Arrangements house   Transportation Anticipated car, drives self   Living Environment Comment Pt has 1 RAYMON, and all needs met on main floor. has tub shower wiht grab bars   Self-Care   Usual Activity Tolerance fair   Current Activity Tolerance poor   Regular Exercise No   Equipment Currently Used at Home grab bar, tub/shower   Activity/Exercise/Self-Care Comment Activity tolerance limited by COPD associated SOB.    Functional Level   Ambulation 0-->independent   Transferring 0-->independent   Toileting 0-->independent   Bathing 1-->assistive equipment   Dressing 0-->independent   Eating 0-->independent   Cognition 0 - no cognition issues reported   Fall history within last six months yes   Number of times patient has fallen within last six months 1   Prior Functional Level Comment Pt is I with most BADLs, although occasionally has assist from wife for dressing and bathing. He still drives, but wife completes all cooking, cleaning, and medication management.   General Information   Referring Physician Tressa Corbett MD   Patient/Family Goals Statement Return to OF   Additional Occupational Profile Info/Pertinent History of Current Problem  84 year old male with history of prior stroke (per family secondary to aortic atheroma), BPH, tobacco/COPD, who presents with acute onset L hemiparesis and neglect.    Precautions/Limitations fall precautions   Cognitive Status Examination   Cognitive Comment Reports no issues at baseline. Confused on eval, will assess further.   Visual Perception   Visual Acuity Wears bifocals. Endorses baseline blurry vision from old CVA, but does not think worsened this admission.   Visual Attention L sided inattention   Range of Motion (ROM)   ROM Comment AROM WFL RUE, and able to obtain approx 90 degrees AROM shoulder flex in LUE. PROM WFL in LUE.    Strength   Strength Comments WFL RUE. 3+/5 LUE   Hand Strength   Hand Strength Comments 3+/5 R hand, 3-/5 L hand   Coordination   Gross Motor Coordination Impaired in L visual field with RUE. TSERING MCGRAW due to neglect/hemiparesis.   Transfer Skill: Bed to Chair/Chair to Bed   Level of Evans: Bed to Chair minimum assist (75% patients effort)   Physical Assist/Nonphysical Assist: Bed to Chair set-up required;supervision;verbal cues;nonverbal cues (demo/gestures);2 persons   Transfer Skill: Sit to Stand   Level of Evans: Sit/Stand minimum assist (75% patients effort)   Physical Assist/Nonphysical Assist: Sit/Stand set-up required;supervision;verbal cues;nonverbal cues (demo/gestures);2 persons   Grooming   Level of Evans: Grooming maximum assist (25% patients effort)   Physical Assist/Nonphysical Assist: Grooming set-up required;supervision;verbal cues;nonverbal cues (demo/gestures);1 person assist   Clinical Impression   Criteria for Skilled Therapeutic Interventions Met yes, treatment indicated   OT Diagnosis Decreased I in ADLs due to deconditioning   Assessment of Occupational Performance 5 or more Performance Deficits   Identified Performance Deficits dressing, bathing, toileting, g/h, functional endurance, cognition   Clinical Decision Making (Complexity) High complexity   Therapy Frequency 6x/week   Predicted Duration of Therapy Intervention (days/wks) 2 week   Anticipated Discharge Disposition Transitional Care Facility;Acute Rehabilitation Facility   Risks and Benefits of Treatment have been explained. Yes   Patient, Family & other staff in agreement with plan of care Yes   Total Evaluation Time   Total Evaluation Time (Minutes) 10

## 2020-01-08 NOTE — PROGRESS NOTES
01/08/20 1000   Quick Adds   Type of Visit Initial PT Evaluation  (Alice Kimble, PT, DPT )       Present no   Language English    Living Environment   Lives With spouse   Living Arrangements house  (single story )   Home Accessibility stairs to enter home   Number of Stairs, Main Entrance 1   Stair Railings, Main Entrance none   Transportation Anticipated car, drives self   Living Environment Comment Pt lives in single story home with SO. 1 RAYMON home. Tub shower.    Self-Care   Usual Activity Tolerance fair   Current Activity Tolerance poor   Regular Exercise No   Equipment Currently Used at Home none   Activity/Exercise/Self-Care Comment Pt endorses that he is able to shower and dress self/basic ADLs indep. he notes his wife does a lot around the house dt dec activity tolerance related to COPD.    Functional Level Prior   Ambulation 0-->independent   Transferring 0-->independent   Toileting 0-->independent   Bathing 0-->independent   Communication 0-->understands/communicates without difficulty   Swallowing 0-->swallows foods/liquids without difficulty   Cognition 0 - no cognition issues reported   Fall history within last six months yes   Number of times patient has fallen within last six months 1   Which of the above functional risks had a recent onset or change? ambulation;transferring;fall history   Prior Functional Level Comment Pt reports indep with mobility. He does endorse deconditioning related to COPD. denies use of DME.    General Information   Onset of Illness/Injury or Date of Surgery - Date 01/07/20   Referring Physician Tressa Corbett MD   Patient/Family Goals Statement get stronger   Pertinent History of Current Problem (include personal factors and/or comorbidities that impact the POC) Efe Cardona is a 84 year old male with history of prior stroke (per family secondary to aortic atheroma), BPH, tobacco/COPD, who presents with acute onset L hemiparesis and neglect.   Per family, from his prior stroke he had minimal residual weakness in the left leg and decreased fine motor skill in his hand (they are unsure which).  This morning, his wife noted that when he woke up at 630 he was unable to support himself on his left leg.  He went to make coffee and she heard him fall at 630.  He was last normal when he went to bed yesterday at 10pm. Head CT shows multiple chronic embolic strokes and microvascular disease. The insula may be infarcted.  CTA shows diffuse atherosclerosis with an occlusion of the R. ICA and M1 segment.  There is contrast opacification within the R M2 branches of the sylvian fissure. At baseline he is able to take care of himself, but is less active.  He drives to his own appointments alone and can stay home alone when family leaves.   Precautions/Limitations fall precautions   General Info Comments activity; up with A   Cognitive Status Examination   Orientation orientation to person, place and time   Level of Consciousness alert   Follows Commands and Answers Questions 100% of the time   Personal Safety and Judgment intact   Memory intact   Pain Assessment   Patient Currently in Pain No   Integumentary/Edema   Integumentary/Edema no deficits were identifed   Posture    Posture Forward head position;Protracted shoulders   Range of Motion (ROM)   ROM Comment WFL throughout, L side PROM, limited AROM dt dec strength    Strength   Strength Comments R UE/LE 4-5/5 grossly. L UE < 3/5. L LE at least 3/5    Bed Mobility   Bed Mobility Comments HOB elevated. mod-max A for trunk management   Transfer Skills   Transfer Comments STS and SPT with mod A. difficulty with naviagation of L LE   Gait   Gait Comments not assessed today    Balance   Balance Comments not formally assessed, pt is high falls risk dt weakness    Sensory Examination   Sensory Perception Comments L sided visual field cut to midline    Coordination   Coordination Comments altered, L neglect    Muscle Tone  "  Muscle Tone no deficits were identified   General Therapy Interventions   Planned Therapy Interventions ADL retraining;balance training;bed mobility training;gait training;strengthening;transfer training;risk factor education;home program guidelines;progressive activity/exercise;neuromuscular re-education;motor coordination training   Clinical Impression   Criteria for Skilled Therapeutic Intervention yes, treatment indicated   PT Diagnosis dec functional mobility    Influenced by the following impairments neglect, hemiparesis, deconditioning, fatigue   Functional limitations due to impairments bed mobility, transfers, gait, staurs, activity tolerance, balance   Clinical Presentation Evolving/Changing   Clinical Presentation Rationale PMH, clinical judgement, current mobility    Clinical Decision Making (Complexity) Moderate complexity   Therapy Frequency 6x/week   Predicted Duration of Therapy Intervention (days/wks) 1 week    Anticipated Equipment Needs at Discharge   (TBD)   Anticipated Discharge Disposition Acute Rehabilitation Facility   Risk & Benefits of therapy have been explained Yes   Patient, Family & other staff in agreement with plan of care Yes   Clinical Impression Comments PT eval completed, tx indicated    [facility] Gn[employer] TM \"6 Clicks\"   2016, Trustees of [facility], under license to [employer].  All rights reserved.   6 Clicks Short Forms Basic Mobility Inpatient Short Form   [facility] AM-PAC  \"6 Clicks\" V.2 Basic Mobility Inpatient Short Form   1. Turning from your back to your side while in a flat bed without using bedrails? 3 - A Little   2. Moving from lying on your back to sitting on the side of a flat bed without using bedrails? 2 - A Lot   3. Moving to and from a bed to a chair (including a wheelchair)? 2 - A Lot   4. Standing up from a chair using your arms (e.g., wheelchair, or bedside chair)? 2 - A Lot   5. To walk in hospital room? 2 - A Lot   6. Climbing 3-5 " steps with a railing? 1 - Total   Basic Mobility Raw Score (Score out of 24.Lower scores equate to lower levels of function) 12   Total Evaluation Time   Total Evaluation Time (Minutes) 7

## 2020-01-08 NOTE — CONSULTS
Social Work: Assessment with Discharge Plan    Patient Name:  Efe Cardona  :  1935  Age:  84 year old  MRN:  [id number]  Risk/Complexity Score:  Filed Complexity Screen Score: 7  Completed assessment with:  Patient, Chart Review    Presenting Information   Reason for Referral:  Stroke consult  Date of Intake:  2020  Referral Source:  Physician  Decision Maker:  Patient/ Self  Alternate Decision Maker:  1)  Pooja Cadrona  2)  Brenda Siegel  3)  Peter Cardona  Health Care Directive:  Copy in Chart  Living Situation:  Pt lives with his spouse in a one level home, three steps to enter.  Previous Functional Status:  Independent (spouse does the cooking and cleaning)  Patient and family understanding of hospitalization:  Pt expressed having difficulties moving his left leg and inquired if this was d/t the new stroke.    Cultural/Language/Spiritual Considerations:  None Identified  Adjustment to Illness:  Pt appears to be appropriately adjusting, discussed that he has had previous strokes.    Physical Health  Reason for Admission:    1. Cerebrovascular accident (CVA), unspecified mechanism (H)      Services Needed/Recommended:  ARU    Mental Health/Chemical Dependency  Diagnosis:  None  Support/Services in Place:  None  Services Needed/Recommended:  None    Support System  Significant relationship at present time:  Pooja (Spouse)-  She is in good health and able to provide 24/7 support per pt.  Family of origin is available for support:  Pt has 5 children, Denny (Daughter) lives nearby and could assist PRN.  Other support available:  None  Gaps in support system:  None  Patient is caregiver to:  None     Provider Information   Primary Care Physician:  Reggie Abraham   [phone]   Clinic:  [address] / [facility] [address]      :  N/A    Financial   Income Source:  Social Security  Financial Concerns:  None Identified  Insurance:    Payor/Plan Subscriber Name  Rel Member # Group #   MEDICARE - MEDICARE F* KARL BUSTAMANTE  [id number]       ATTN CLAIMS, [address]   BCBS - BCBS PLATINUM * KARL BUSTAMANTE F [id number]      [address] [address]       Discharge Plan   Patient and family discharge goal:  TCU  Provided education on discharge plan:  YES  Patient agreeable to discharge plan:  Pt would prefer a TCU closer to home instead of ARU (closest ARU to his home is in [place])  A list of Medicare Certified Facilities was provided to the patient and/or family to encourage patient choice. Patient's choices for facility are:    [facility] [address] [address] [phone]    Pt/family was given the Medicare Compare list for SNF, with associated star ratings to assist with choice for referrals/discharge planning Yes    Education was given to pt/family that star ratings are updated/maintained by Medicare and can be reviewed by visiting www.medicare.gov Yes    Will NH provide Skilled rehabilitation or complex medical:  YES  General information regarding anticipated insurance coverage and possible out of pocket cost was discussed. Patient and patient's family are aware patient may incur the cost of transportation to the facility, pending insurance payment: NO  Barriers to discharge:  Medical Stability    Discharge Recommendations   Anticipated Disposition:  TCU  Transportation Needs:  TBD  Name of Transportation Company and Phone:  TBD    Additional comments   REYNA met with pt to introduce self, explain role and provide support.  SW had to stand on pt's right side during the assessment d/t left field cut.  Pt would like to get closer to home at discharge, his spouse does not drive in the cities.  REYNA educated pt on the recommendation of an ARU and informed him that the closest ARU to his home is [facility] in [place].  Pt expressed that he would prefer a TCU closer to home over ARU.  REYNA reviewed a list  of TCUs near his home in [place] with him and he identified the following preference (SW encouraged him to review the list for back-up TCU preferences as well):  [facility] [address] [address]  [phone]  - sent over an E-referral.    MEEK [place], [facility]  ICU   [facility]  Phone:  [phone]  Pager:  [phone]

## 2020-01-08 NOTE — PLAN OF CARE
Major Shift Events: TTE completed at bedside. Speech therapy cleared pt for DD1 diet and thin liquids. Pt transferred to chair with assitance of PT.    Plan: PT/OT/Rehab workup. Transferred to  at approx. 1430.   For vital signs and complete assessments, please see documentation flowsheets.       Problem: Adult Inpatient Plan of Care  Goal: Plan of Care Review  Outcome: No Change

## 2020-01-08 NOTE — PROVIDER NOTIFICATION
1/7/2020 2030    Neurocrit resident contacted due to the following neuro changes noted during RN to RN hand off assessment:  Left arm sensation is now absent.   Left grasp absent   Vision blurred  Facial twitching- left eye and eyebrow- involuntary.  L pupil > rt    When resident arrived pt was able to grasp slightly with left hand, sensation continued to be absent.  He felt that pt has the same assessment as previously noted in chart by neuro resident.     Plan:   Update resident if pt continues to have left facial twitching or it increases.

## 2020-01-08 NOTE — PLAN OF CARE
Discharge Planner PT   Patient plan for discharge: not discussed today     Current status: PT eval completed, tx indicated. Pt performed bed mobility with min A, supine>sit with HOB elevated and mod A for trunk management. Sits at EOB with min A dt poor core stability. Completed STS and stepping pivot transfer bed>chair with min-mod A. Inc difficulty with L LE. Completed NM re-ed for L UE/LEs. AVSS on RA.    Barriers to return to prior living situation: medical needs, current mobility, level of A    Recommendations for discharge: ARU    Rationale for recommendations: Pt is below baseline, he has interdisciplinary needs, motivated to work with therapies, has supportive SO, and anticipate once medically stable he would tolerate extended therapy times.        Entered by: Alice Espinoza 01/08/2020 11:05 AM

## 2020-01-08 NOTE — PROGRESS NOTES
Transferred to: 6A at 1430. Neuro check completed at bedside with oncoming RN.   Belongings: underwear.  Kaur removed? Yes  Central line removed? NA  Chart and medications sent with patient YES  Family notified: Family present at transfer

## 2020-01-08 NOTE — PLAN OF CARE
Discharge Planner OT   Patient plan for discharge: not discussed  Current status: Pt needing hand over hand assist to incorporate L UE into ADLs. Occasionally tracking to L side with VCs. AROM to 90 degrees shoulder flex with LUE. BP up to 186/103 during G/H. Min Ax2 for sit>stand and pivot recliner>bed, shuffling feet along floor, and having difficulty sequencing steps, somewhat confused.   Barriers to return to prior living situation: Decreased ADL independence and activity tolerance  Recommendations for discharge: ARU  Rationale for recommendations: Increase functional endurance and ADL independence at rehab. With demonstration of added therapy tolerance, pt will be appropriate for ARU.       Entered by: Sven Ochoa 01/08/2020 2:45 PM

## 2020-01-08 NOTE — PLAN OF CARE
Major Shift Events  Neuro: Finger to nose: ataxic with rt hand, unable to do with left hand.  Can reach up with left arm spontaneously, no fine motor movement and is unable to follow commands with left hand.  Efe is able to raise left arm on command Heel to shin: ataxic with rt leg, unable with left leg.  Left leg able to raise and bend knee.  dorsiflextion present but weak and plantar flexion weaker than dorsiflextion.  with deficit in cranial nerves  normal right upper motor strength, left arm unable to push against resistance.  Unable to grasp with left hand with occassional intermittent ability to grasp with left hand.  Sensation to touch and pain absent in left hand and arm to shoulder.  Sensation absent on skin of L chest, abd, back, and all of left LE.  Left pupil larger than right and sluggish.  Left visual field cut to midline.  Left sided visual and motor neglect, will look to the left when prompted.  Left facial droop present, tongue midline, sensation absent in left face to midline.  Shoulder shrug intact in bilateral shoulders.    At 2030 pt had involuntary left eye movements and twitching on left eye lid to mouth.  That subsided when pt fell asleep.  At 2200 after awakening pt he began having left facial- eye, cheek, forehead, mouth twitching with twitching present in the left neck muscles causing his head to move up off the pillow.  This lasted about 4 minutes and after closing his eyes the movement disappeared.  Pt states he has not ever had a tick or twitching movements of his face.  He is aware of its presence.      For vital signs and complete assessments, please see documentation flowsheets.

## 2020-01-08 NOTE — PLAN OF CARE
D/I/A: Neuro status unchanged; patient w/ notable left sided weakness, facial droop, sensory impairment, neglect and visual field cut. Ataxia noted bilaterally, baseline per patient. [place] at baseline. Vision at baseline. Patient following commands, makes needs known, able to move all extremities upon command - needs direct instruction to move left extremities. PERRL intact. Passed swallow eval per specialist - holding fluids till bedrest discontinued at 6PM. HR sinus rhythm, infrequent PVC's. BP stable. Lungs coarse, fair productive cough, swallows sputum. On room air. Small smear of stool, (+)flatus. Urethral cath in place w/ adequate output. Head laceration cdi. Stroke handbook provided to patient. Wife contacted and updated.  P: Continue to monitor closely. Transfer to 6A in AM.

## 2020-01-09 NOTE — PROGRESS NOTES
Social Work Services Progress Note    Hospital Day: 3  Date of Initial Social Work Evaluation:  1/8/2020  Collaborated with:  Dr. Robledo, patient, wife (Paulina), son in law (Vahid)    Data:  SW is following for discharge planning.   SW received an update from Dr. Robledo indicating that pt is medically ready for discharge.  OT/PT and ST are recommending an ARU stay.    Intervention: REYNA spoke with Dr. Robledo.  REYNA met with pt, wife and son in law and discussed discharge planning. SW informed pt/family that pt is medically ready for discharge and an ARU stay is recommended.  Pt and family agreeable to pursuit of ARU placement. A list of ARU/with medicare rating's was provided.  Per discussion, ARU placement at [facility] is first choice. REYNA has informed pt/family that they cost of transport from [facility] to the receiving ARU is private pay.  SW has informed pt/family that if pt is accepted to [facility], the estimated cost of transport through [facility] (w/c) is $822.00 and if [facility] is used (they only offer stretcher), the estimated cost is $750.00.  Pt and family indicate that they would want to use [facility].  SW spoke with [facility] (Nimesh) and he is available to provide transport.  REYNA phoned Admissions at [facility] ([place] [phone]) and left a message referring pt. Assessment materials faxed.   REYNA phoned Admissions at [facility] (Shreya) in [place] and cancelled TCU referral.    Assessment:  Pt is agreeable to acute rehab placement.    Discharge Plan:    Anticipated Disposition:  Acute Rehab placement    Barriers to d/c plan:  SW will await a decision from [facility].    Follow Up:  REYNA will continue to follow.    DANIELITO Morgan  Social Work, 6A  Phone:  [phone]  Pager:  [phone]  1/9/2020

## 2020-01-09 NOTE — PROGRESS NOTES
Social Work Services Discharge Note      Patient Name:  Efe Cardona     Anticipated Discharge Date:  1/10/2020    Discharge Disposition:   [facility] ([phone])    Following MD:  Facility Assignment     Pre-Admission Screening (PAS) online form has been completed.  The Level of Care (LOC) is:  Determined  Confirmation Code is:  Not completed as pt is admitting to acute rehab unit.       Additional Services/Equipment Arranged:  REYNA has confirmed readiness for discharge with Dr. Robledo. REYNA has confirmed acceptance to [facility] with Admissions ([place]).  However, [facility] will not accept pt for admit on 1/10/2020 if pt BP is over 180, if pt requires IV BP meds or PRN BP meds between now and 10am tomorrow.   has arranged for [facility] (george [phone]) to provide private pay stretcher transport on 1/10/2020 at 10am. Pt and wife are aware that payment for transport must be made upon arrival [facility].     Patient / Family response to discharge plan:  Pt, wife, and son in law voice understanding of the discharge plan and agreement with the discharge plan.     Persons notified of above discharge plan:  Pt, wife, son in law, Dr Robledo and 6A nursing.    Staff Discharge Instructions:  Please fax discharge orders and signed hard scripts for any controlled substances (REYNA will complete this task).  Please print a packet and send with patient.     CTS Handoff completed:  YES    Medicare Notice of Rights provided to the patient/family:  YES    DANIELITO Morgan  Social Work, 6A  Phone:  [phone]  Pager:  [phone]  1/9/2020

## 2020-01-09 NOTE — PROGRESS NOTES
Care Coordinator Progress Note    Admission Date/Time:  1/7/2020  Attending MD:  Dr Gaby Gordon    Data  Informed by Dr Robledo family has questions about insurance and hospital stay.     Coordination of Care & Referrals  Introduced myself to pt, his wife and son. Wife wondering about transfer to rehab and if pts go directly to rehab vs going home first. She said when pt had a prior hospital stay pt wanted to go home first. Informed her pts go directly to rehab. Social Work will meet with them for discharge planning, insurance coverage and transportation costs. Wife already aware transport will be private pay.   Wife did not have questions about hospital insurance coverage. Informed her the hospital would bill pt's insurance.   Wife said they have 5 children and 21 great-grandchildren.      Plan  Social Work for discharge planning to in rehab.       Anitra Blackmon RN Care Coordinator  Unit 6A, [facility]

## 2020-01-09 NOTE — PLAN OF CARE
Status: Pt admitted for stroke work up.   Vitals: VSS ex HTN - had 40 mg of labetalol to get SBP <140. On RA. CCM in place showing frequent PVCs.   Neuros: A&Ox4, L field cut, L neglect, L facial droop, L pronator drift. Sluggish pupils. [place]. Numbness/decreased sensation in LUE. L side 3/5, R side 5/5. Mild word finding difficulty.  IV: PIV infusing NS @ 75 mL/hr.   Resp/trach: Coarse LS w/ inspiratory & expiratory wheezes. Productive cough. SOB w/ exertion. Hx COPD.   Diet: NPO, awaiting SLP to assess.   Bowel status: BS+, incontinent of stool over shift.   : Incontinent of urine.   Skin: Small wound on L forehead, FELIPE. Skin shira in color.   Pain: Denies.   Activity: T&R q2h over shift. Per report, up w/ heavy 2.   Social: No visitors over shift.   Plan: Continue to monitor & follow POC. SW following to find TCU placement.

## 2020-01-09 NOTE — PROGRESS NOTES
Care Coordinator Progress Note    Admission Date/Time:  1/7/2020  Attending MD:  Dr Gaby Gordon    Data  Received Care Coordinator consult for stroke. Chart reviewed, discussed with interdisciplinary team. Pt s/p ischemic stroke. Transferred from ICU to 6A yesterday. ARU is recommended. Social Work will follow for rehab placement and discharge planning. No RNCC needs at this time.       Anitra Blackmon, RN Care Coordinator  Unit 6A, [facility]

## 2020-01-09 NOTE — PLAN OF CARE
Status: Pt transferred tfrom 4A at 1500  Vitals: VSS except HTN within parameters and occasional tachycardia.  Neuros: A&Ox4, L cut and L neglect, L droop, decreased sensation in LUE, absent grasp in L hand, L drift, LUE 2-3/5, mod d/p on L, Pupils sluggish.   IV: PIV-SL-on the list for fluids now that pt is NPO  Resp/trach: Insp and exp wheezes  Diet: NPO  Bowel status: Pt had 3 small BM this shift; was continent and incontinent  : Voiding spont; incontinent  Skin: Pt has small head lac to L forehead; FELIPE  Pain: No c/o pain  Activity: Up w/2A   Social: Family at bedside this evening  Plan: SW looking for appropriate placement close to home.

## 2020-01-09 NOTE — PLAN OF CARE
Pt here with R MCA stroke work-up, vs ex HTN within parameters, neuros include: a/o x4, slow to respond, WFD, lethargic, sluggish pupils but reactive, L neglect/L cut, L droop, L side 3/5, R side 5/5, L-side numbness and decreased sensation. PIV running NS @ 75ml/hr, DD1 with nectar and assistance eating, up heavy AO2 w/GB and pivot, on CCM with NSR, denies pain throughout shift. Pt sat up in chair with moderate tolerance, incontinent of bladder and bowel, had x2 small bowel movement this morning. Family @ bedside and supportive, please avoid using PRN IV BP medications, call MD team if you prior to administering them, plan for rehab tomorrow. Continue to monitor per orders.

## 2020-01-09 NOTE — PROGRESS NOTES
[facility], [place]    Stroke Progress Note    Interval Events  - Due to concerns for aspiration, NPO overnight  - -200  - On room air, O2 sat 92%    Summary  Efe Cardona is a 84 year old male history of prior stroke (per family secondary to aortic atheroma), BPH, tobacco/COPD who presents  who presents with acute onset L hemiparesis and neglect concerning for right MCA territory ischemic infarction.    Impression  Ischemic Stroke due to large-artery atherosclerosis (embolus/thrombosis)    Plan  # Acute large right MCA territory ischemic stroke due to large artery atherosclerosis  - Unable to do thrombectomy  - Neurochecks Q 4 hour  - Labetalol and Hydralazine PRN for SBP > 140   - Telemetry, EKG  - Bedside Glucose Monitoring  - A1c 5.7  -  Daily  - Change Simvastatin 10 to Pravastatin 10 due to low LDL (27)  - PT/OT/SLP   - Stroke Education  - Depression Screen  - Apnea Screen  - Euthermia, Euglycemia     #COPD  - resume PTA abuterol q4h PRN  - continuous pulse ox monitoring   - maintain O2 saturations > 88%       #Hypertension  - Increase Amlodipine to 10 mg  - Labetalol and Hydralazine PRN for SBP > 140      #HLD  - Change Simvastatin 10 to Pravastatin 10 due to low LDL (27)    FEN: Dysphagia diet level 1 and nectar thick liquid  PPX:    DVT prophylaxis: SCDs, heparin subcutaneous 5000 q8h     GI: Not indicated  Code Status: Full, presumed  Dispo: ARU   _____________________________________________________    Medications   Home Meds  Prior to Admission medications    Medication Sig Start Date End Date Taking? Authorizing Provider   albuterol (PROAIR HFA, PROVENTIL HFA, VENTOLIN HFA) 108 (90 BASE) MCG/ACT inhaler Inhale 2 puffs into the lungs every 4 hours as needed for shortness of breath / dyspnea 9/11/13   Reggie Abraham MD   amLODIPine (NORVASC) 5 MG tablet Take 1 tablet by mouth daily. 7/20/13   Lucia Membreno MD   aspirin  MG tablet Take 1 tablet  (325 mg) by mouth daily 2/13/14   Cj Perry,    BENAZEPRIL HCL PO Take 40 mg by mouth daily.    Reported, Patient   fluticasone-salmeterol (ADVAIR) 250-50 MCG/DOSE diskus inhaler Inhale 1 puff into the lungs every 12 hours. 7/20/13   Lucia Membreno MD   mupirocin (BACTROBAN) 2 % cream Apply topically 3 times daily 9/11/13   Reggie Abraham MD   nicotine (NICODERM CQ) 21 MG/24HR patch 2h hr Place 1 patch onto the skin daily. 7/20/13   Lucia Membreno MD   simvastatin (ZOCOR) 10 MG tablet Take 1 tablet by mouth At Bedtime. 7/20/13   Lucia Membreno MD   tiotropium (SPIRIVA) 18 MCG inhalation capsule Inhale 1 capsule into the lungs daily. 7/20/13   Lucia Membreno MD       Scheduled Meds    albuterol  2.5 mg Nebulization 4x Daily     amLODIPine  5 mg Oral Daily     aspirin  325 mg Oral Daily     fluticasone-vilanterol  1 puff Inhalation Daily     heparin ANTICOAGULANT  5,000 Units Subcutaneous Q8H     nicotine  1 patch Transdermal Daily     nicotine   Transdermal Q8H     pravastatin  10 mg Oral Daily     sodium chloride (PF)  10 mL Intravenous Once     umeclidinium  1 puff Inhalation Daily       Infusion Meds    sodium chloride 75 mL/hr at 01/09/20 0402       PRN Meds  albuterol, glucose **OR** dextrose **OR** glucagon, hydrALAZINE, labetalol, magnesium sulfate, metoclopramide **OR** metoclopramide, naloxone, ondansetron **OR** ondansetron, polyethylene glycol, potassium chloride, potassium chloride with lidocaine, potassium chloride, potassium chloride, potassium chloride, potassium phosphate (KPHOS) in D5W IV, potassium phosphate (KPHOS) in D5W IV, potassium phosphate (KPHOS) in D5W IV, potassium phosphate (KPHOS) in D5W IV, prochlorperazine **OR** prochlorperazine **OR** prochlorperazine, senna-docusate       PHYSICAL EXAMINATION  Temp:  [97.8  F (36.6  C)-99.5  F (37.5  C)] 97.8  F (36.6  C)  Pulse:  [] 94  Heart Rate:  [75-99] 97  Resp:  [14-28] 20  BP:  (131-206)/() 131/77  SpO2:  [91 %-98 %] 94 %     General:  patient lying in bed without any acute distress    HEENT:  normocephalic/atraumatic  Cardio:  RRR  Pulmonary:  no respiratory distress  Abdomen:  soft  Extremities:  no edema  Skin:  intact        Stroke Scales  National Institutes of Health Stroke Scale  Exam Interval: 24 hours post onset of symptom +/- 20 minutes   Score    Level of consciousness: (0)   Alert, keenly responsive    LOC questions: (0)   Answers both questions correctly    LOC commands: (0)   Performs both tasks correctly    Best gaze: (0)   Normal    Visual: (2)   Complete hemianopia    Facial palsy: (2)   Partial paralysis (total/near total of lower face)    Motor arm (left): (2)   Some effort against gravity    Motor arm (right): (0)   No drift    Motor leg (left): (2)   Some effort against gravity    Motor leg (right): (0)   No drift    Limb ataxia: (0)   Absent    Sensory: (2)   Severe to total sensory loss    Best language: (0)   Normal- no aphasia    Dysarthria: (1)   Mild to moderate dysarthria    Extinction and inattention: (2)   Profound paige-inattention / extinction > one modality        Total Score:  13       Imaging  I personally reviewed all imaging; relevant findings per HPI.     Lab Results Data   CBC  Recent Labs   Lab 01/08/20  0410 01/07/20  1649 01/07/20  1004 01/07/20  0805   WBC 10.1  --  12.8* 8.3   RBC 4.39*  --  4.85 4.69   HGB 13.6  --  14.8 14.5   HCT 41.1  --  45.5 42.9    208 221 197     Basic Metabolic Panel    Recent Labs   Lab 01/08/20  0410 01/07/20  1004 01/07/20  0805    142 140   POTASSIUM 4.2 4.4 4.3   CHLORIDE 112* 110* 112*   CO2 24 26 24   BUN 21 25 24   CR 1.39* 1.47* 1.44*   GLC 96 117* 125*   LAWRENCE 8.4* 8.8 8.4*     Liver Panel  Recent Labs   Lab Test 01/07/20  0805 07/19/13  0135   PROTTOTAL 6.5* 7.3   ALBUMIN 3.3* 3.6   BILITOTAL 0.8 0.5   ALKPHOS 84 77   AST 19 16   ALT 34 26     INR  Recent Labs   Lab Test 01/07/20  1004  01/07/20  0805 02/13/14  0540   INR 1.05 1.10 1.24*      Lipid Profile  Recent Labs   Lab Test 01/08/20  0410 07/19/13  0139   CHOL 71 148   HDL 28* 28*   LDL 27 100   TRIG 80 101   CHOLHDLRATIO  --  5.3*     A1C  Recent Labs   Lab Test 01/07/20  1245   A1C 5.7*     Troponin I  Recent Labs   Lab 01/07/20  1004 01/07/20  0805   TROPI <0.015 <0.015

## 2020-01-09 NOTE — PLAN OF CARE
Discharge Planner SLP   Patient plan for discharge: Unknown  Current status: Recommend dysphagia diet 1, nectar-thick liquids, no straws, with 1:1 supervision and feeding assist.  Recommend critical meds crushed in puree.  Ensure pt is fully alert and upright for all PO, given small bites/sips, alternating bites/sips.  Overt s/sx of aspiration with thin liquids, oral dysphagia impacting diet advancement.  SLP to follow for diet advancement and PO tolerance.  Pt would benefit from formal speech-language evaluation as appropriate.  Barriers to return to prior living situation: Dysphagia, communication, medical status, mental status, TF  Recommendations for discharge: ARU  Rationale for recommendations: Below baseline function; pt will benefit from ongoing ST targeting swallowing.  Pt presents with multidisciplinary rehab needs and is able to tolerate 3 hours of therapy per day.       Entered by: Lawanda Barbour 01/09/2020 9:55 AM

## 2020-01-10 NOTE — PLAN OF CARE
Discharge Planner SLP   Patient plan for discharge: Pt did not state  Current status: Recommend continuation of dysphagia diet 1, nectar thick liquids, no straws, with 1:1 feeding assist/supervision.  Ensure pt is fully alert and upright for all PO, given small bites/sips, alternating bites/sips.  Check oral cavity for buccal pocketing during, after PO intake.  Oral dysphagia, overt s/sx of aspiration with thin liquids impacting diet advancement.  Pt would benefit from formal speech-language evaluation as appropriate.  SLP to follow  Barriers to return to prior living situation: Dysphagia, communication, medical status  Recommendations for discharge: ARU  Rationale for recommendations: Below baseline function; pt will benefit from ongoing ST targeting swallowing.  Pt presents with multidisciplinary rehab needs and is able to tolerate 3 hours of therapy per day.       Entered by: Lawanda Barbour 01/10/2020 9:13 AM

## 2020-01-10 NOTE — DISCHARGE SUMMARY
[facility], [place]    Neurology Stroke Discharge Summary    Date of Admission: 1/7/2020  Date of Discharge: 01/10/2020    Disposition: Discharged to rehabilitation facility  Primary Care Physician: Reggie Abraham      Admission Diagnosis:   Acute Ischemic Stroke involving R MCA territory     Discharge Diagnosis:   Ischemic Stroke due to large-artery atherosclerosis (embolus/thrombosis)    Problem Leading to Hospitalization (from HPI):   Efe Cardona is a 84 year old male with history of prior stroke (per family secondary to aortic atheroma), BPH, tobacco/COPD, who presents with acute onset L hemiparesis and neglect.  Per family, from his prior stroke he had minimal residual weakness in the left leg and decreased fine motor skill in his hand (they are unsure which).  This morning, his wife noted that when he woke up at 630 he was unable to support himself on his left leg.  He went to make coffee and she heard him fall at 630.     Please see H&P dated 1/7/2020 for further details about presentation.    Brief Hospital Course:   Patient presented with acute onset left hemiparesis and neglect.      Found to have  Acute large right MCA territory ischemic stroke    IV tPA was not given due to outside of the window.      Work-up as stated below under Pertinent Investigations.    Etiology is thought to be Large vessel occlusion     Rehab evaluation: OT, PT and SLP.     Smoking Cessation: education provided    BP Long-term Goal: 140/90 or less    Antithrombotic/Anticoagulant Agent: aspirin 81 mg and clopidogrel (Plavix) 75 mg    Statins: Prior to admission statin agent changed to Pravastatin 10       Hgb A1C Goal: < 7.0    Complications: None.     Other problems addressed during this hospitalization:      PERTINENT INVESTIGATIONS    Labs  Lipid Panel:   Recent Labs   Lab Test 01/08/20  0410 07/19/13  0139   CHOL 71 148   HDL 28* 28*   LDL 27 100   TRIG 80 101   CHOLHDLRATIO  --  5.3*      A1C:   Lab Results   Component Value Date    A1C 5.7 01/07/2020     INR:   Recent Labs   Lab 01/07/20  1004 01/07/20  0805   INR 1.05 1.10      Coag Panel / Hypercoag Workup: Not indicated  Pending test results:     Echo:    Interpretation Summary  Technically difficult study. Poor acoustic windows.  No cardiac source for embolus identified. Negative bubble study.  Global and regional left ventricular function is normal with an EF of 60-65%.  RV size and function are probably normal.  Previous study not available for comparison.    Imaging:     Research Brain MRI without contrast  Impression: Brain MRI per research protocol demonstrates:  1. Large acute right MCA infarct, correlating with region of core  infarct seen on prior CT perfusion study. MR perfusion demonstrates  thin peripheral penumbra about the right MCA infarct. No evidence of  associated intracranial hemorrhage.  2. Evidence of prior bilateral MCA territory infarcts, with bilateral  frontoparietal encephalomalacia.    CTA  HEAD NECK WITH CONTRAST   Impression:    1. Head CTA demonstrates decreased opacification of the distal M1  segment of the right middle cerebral artery, presumably secondary to  thrombus, with decreased visualization of M2, M3, and M4 branches  distal to this. This appears mildly progressed from the study  previously performed at the outside hospital earlier in the day.    2. 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 No new disease within the neck is  identified.    CT HEAD W/O CONTRAST   Impression:   1. Loss of gray-white differentiation in the right insula and right  frontal operculum concerning for right middle cerebral artery  territory infarct.  2. Evidence of multiple prior infarcts within the cerebral and  cerebellar cortex bilaterally.      Endovascular procedure: Not applicable     Cardiac  Monitoring: Patient had > 24 hrs of cardiac monitor while in hospital.    Findings: No atrial fibrillation was found.    Education discussed with: patient and spouse on blood pressure management, medical management, smoking cessation plan and diet modification.    During daily rounds, the plan of care was discussed and developed with patient.      PHYSICAL EXAMINATION  Vital Signs:  B/P: 170/113, T: 98.9, P: 82, R: 18    General:  patient lying in bed without any acute distress     HEENT:  normocephalic/atraumatic  Cardio:  RRR  Pulmonary:  no respiratory distress  Abdomen:  non-tender  Extremities:  no edema  Skin:  intact     Neurologic  Mental Status:  fully alert, attentive and oriented, follows commands  Cranial Nerves:  EOMI with normal smooth pursuit, facial sensation intact and symmetric, hearing not formally tested but intact to conversation, palate elevation symmetric and uvula midline mild to mod dysarthria, left facial droop  Motor:  no abnormal movements, normal tone throughout, normal muscle bulk, RUE and RLE 5/5 and LUE and LLE 3/5  Reflexes:  2+ and symmetric throughout  Sensory:  Diminished light touch on left side, LUE and LLE, neglecting the left side  Coordination:  FNF without dysmetria on right side  Station/Gait:  unable to test    National Institutes of Health Stroke Scale (on day of discharge)  NIHSS Total Score:   Exam Interval: 4 days post onset of symptom +/- 20 minutes    Score    Level of consciousness: (0)   Alert, keenly responsive    LOC questions: (0)   Answers both questions correctly    LOC commands: (0)   Performs both tasks correctly    Best gaze: (0)   Normal    Visual: (2)   Complete hemianopia    Facial palsy: (2)   Partial paralysis (total/near total of lower face)    Motor arm (left): (2)   Some effort against gravity    Motor arm (right): (0)   No drift    Motor leg (left): (2)   Some effort against gravity    Motor leg (right): (0)   No drift    Limb ataxia: (0)   Absent     Sensory: (2)   Severe to total sensory loss    Best language: (0)   Normal- no aphasia    Dysarthria: (1)   Mild to moderate dysarthria    Extinction and inattention: (2)   Profound paige-inattention / extinction > one modality          Total Score:  13           Modified Priscilla Scale (on day of discharge): 4-Moderately severe disability; unable to walk without assistance and unable to attend to own bodily    Medications    Current Discharge Medication List      START taking these medications    Details   albuterol (PROVENTIL) (2.5 MG/3ML) 0.083% neb solution Take 1 vial (2.5 mg) by nebulization 4 times daily    Comments: copd  Associated Diagnoses: Cerebrovascular accident (CVA) due to occlusion of right carotid artery (H)      clopidogrel (PLAVIX) 75 MG tablet Take 1 tablet (75 mg) by mouth daily  Qty: 90 tablet, Refills: 0    Comments: CVA, for three months  Associated Diagnoses: Cerebrovascular accident (CVA) due to occlusion of right carotid artery (H)      fluticasone-vilanterol (BREO ELLIPTA) 200-25 MCG/INH inhaler Inhale 1 puff into the lungs daily    Comments: copd  Associated Diagnoses: Cerebrovascular accident (CVA) due to occlusion of right carotid artery (H)      metoprolol tartrate (LOPRESSOR) 25 MG tablet Take 0.5 tablets (12.5 mg) by mouth 2 times daily    Comments: HTN  Associated Diagnoses: Cerebrovascular accident (CVA) due to occlusion of right carotid artery (H); Essential hypertension      pravastatin (PRAVACHOL) 10 MG tablet Take 1 tablet (10 mg) by mouth daily    Comments: CVA  Associated Diagnoses: Cerebrovascular accident (CVA) due to occlusion of right carotid artery (H)         CONTINUE these medications which have CHANGED    Details   amLODIPine (NORVASC) 10 MG tablet Take 1 tablet (10 mg) by mouth daily    Comments: HTN  Associated Diagnoses: Cerebrovascular accident (CVA) due to occlusion of right carotid artery (H); Essential hypertension      aspirin (ASA) 81 MG EC tablet Take 1  tablet (81 mg) by mouth daily    Comments: CVA  Associated Diagnoses: Cerebrovascular accident (CVA) due to occlusion of right carotid artery (H)         CONTINUE these medications which have NOT CHANGED    Details   BENAZEPRIL HCL PO Take 40 mg by mouth daily.      fluticasone-salmeterol (ADVAIR) 250-50 MCG/DOSE diskus inhaler Inhale 1 puff into the lungs every 12 hours.  Qty: 60 Inhaler    Associated Diagnoses: COPD (chronic obstructive pulmonary disease) (H)         STOP taking these medications       albuterol (PROAIR HFA, PROVENTIL HFA, VENTOLIN HFA) 108 (90 BASE) MCG/ACT inhaler Comments:   Reason for Stopping:         mupirocin (BACTROBAN) 2 % cream Comments:   Reason for Stopping:         nicotine (NICODERM CQ) 21 MG/24HR patch 2h hr Comments:   Reason for Stopping:         simvastatin (ZOCOR) 10 MG tablet Comments:   Reason for Stopping:         tiotropium (SPIRIVA) 18 MCG inhalation capsule Comments:   Reason for Stopping:               Additional recommendations and follow up:       General info for SNF    Length of Stay Estimate: Short Term Care: Estimated # of Days <30  Condition at Discharge: Stable  Level of care:skilled   Rehabilitation Potential: Good  Admission H&P remains valid and up-to-date: Yes  Recent Chemotherapy: N/A  Use Nursing Home Standing Orders: Yes     Mantoux instructions    Give two-step Mantoux (PPD) Per Facility Policy Yes     Reason for your hospital stay    Acute ischemic stroke of right brain hemisphere     Activity - Up with nursing assistance     Follow Up and recommended labs and tests    Follow up with [facility] stroke clinic/resident clinic in 2-4 weeks     Additional Discharge Instructions    Take ASA 81 and Plavix 75 for 3 months.  Then stop taking Plavix and start taking .     Full Code     Physical Therapy Adult Consult    Evaluate and treat as clinically indicated.    Reason:  Ischemic stroke     Occupational Therapy Adult Consult    Evaluate and treat as clinically  indicated.    Reason:  Ischemic stroke     Speech Language Path Adult Consult    Evaluate and treat as clinically indicated.    Reason:  Ischemic stroke with dysphagia     Fall precautions     Advance Diet as Tolerated    Follow this diet upon discharge: Orders Placed This Encounter      Dysphagia Diet Level 1 Pureed Nectar Thickened Liquids (pre-thickened or use instant food thickener)       Patient was seen and discussed with the Attending, Dr. Evan Robledo MD  Neurology Resident PGY1  Pager [phone]

## 2020-01-10 NOTE — DISCHARGE SUMMARY
Pt was discharged to rehab, ride provided by stretcher via [employer]. All belongings sent with family. Report given to oncoming nurse

## 2020-01-10 NOTE — PLAN OF CARE
BP (!) 170/113   Pulse 82   Temp 98.9  F (37.2  C) (Oral)   Resp 18   Wt 87.3 kg (192 lb 8 oz)   SpO2 94%   BMI 31.07 kg/m    Status: Pt is here for R MCA stroke work up  VS: HTN w/in parameters: Goal SBP < 180, vitals were otherwise stable. On CCM showing frequent PVCs  Neuro: A&O x4, Forgetful. R side strength 5/5, L side strength 3/5, L sided neglect, L side droop, L side field cut, garbled speech, slow to respond, pupils sluggish  Respiratory: Lung Sounds coarse/diminished, denies SOB  GI: DD1 [place] thick diet, poor intake. No BM over night, BS+  : voids spontaneously without difficulty x2 over night  IV: PIV SL  Skin: Skin is shira, Scab on the L side of his head is WDL and FELIPE  Pain: Denied pain  Activity: Heavy assist x2 w/ GB+walker and Pivot. Up to commode x1. Encourage/Assist with positioning.  Social: Family was present at the beginning of the shift and supportive in his cares  See flow sheets for further details and assessments. Continue to follow POC, notify MD of significant changes.

## 2020-01-10 NOTE — PLAN OF CARE
Occupational Therapy Discharge Summary    Reason for therapy discharge:    Discharged to acute rehabilitation facility.    Progress towards therapy goal(s). See goals on Care Plan in [facility] electronic health record for goal details.  Goals partially met.  Barriers to achieving goals:   discharge from facility.    Therapy recommendation(s):    Continued therapy is recommended.  Rationale/Recommendations:  to increase ADL/IADL IND prior to return home.

## 2020-01-10 NOTE — PLAN OF CARE
Physical Therapy Discharge Summary    Reason for therapy discharge:    Discharged to acute rehabilitation facility.    Progress towards therapy goal(s). See goals on Care Plan in [facility] electronic health record for goal details.  Goals partially met.  Barriers to achieving goals:   discharge from facility.    Therapy recommendation(s):    Continued therapy is recommended.  Rationale/Recommendations:  continue PT to work on impaired functional mobility.

## 2020-01-10 NOTE — PLAN OF CARE
Speech Language Therapy Discharge Summary    Reason for therapy discharge:    Discharged to acute rehabilitation facility.    Progress towards therapy goal(s). See goals on Care Plan in [facility] electronic health record for goal details.  Goals partially met.  Barriers to achieving goals:   discharge from facility.    Therapy recommendation(s):    Continued therapy is recommended.  Rationale/Recommendations:  Recommend continuation of dysphagia diet 1, nectar thick liquids, no straws, with 1:1 feeding assist/supervision. Oral dysphagia, overt s/sx of aspiration with thin liquids impacting diet advancement.  Pt would benefit from formal speech-language evaluation as appropriate.

## 2020-01-10 NOTE — PLAN OF CARE
Status: Pt is here for R MCA stroke work up  Vitals: HTN w/in parameters, vitals were otherwise stable  Neuros: A&O x4, R side strength 5/5, L side strength 3/5, L sided neglect, L side field cut, garbled speech, slow to respond, L side droop, pupils, diminished pupils sluggish, L side numbness  IV: PIV is infusing NS @ 75 mL/hr  Resp/trach: Lung sounds are clear bilatearlly  Diet: DD1 [place] thick diet, poor intact  Bowel status: Bowel sounds are active, one large BM this shift  : Voiding spontaneously  Skin: Skin was shira, Scab on the L side of his head is WDL and FELIPE  Pain: Denied pain  Activity: Heavy assist x2 w/ GB and Pivot  Social: Family was present at the beginning of the shift and supportive in his cares  Plan: Will continue to monitor and follow POC, plan to discharge tomorrow

## 2020-11-05 NOTE — ED TRIAGE NOTES
Patient arrived via [place] [facility]. Pt comes from [facility].  Staff reported that he is congested and very weak and hasnt been eating well. On arrival pt is alert. Pt denies any SOB.  Pt right foot is pinkish and warm compared to the left, provider was notified.

## 2020-11-05 NOTE — ED AVS SNAPSHOT
[facility]  [address] [address]  Phone: [phone]                                    Efe Cardona   MRN: [id number]    Department: [facility]   Date of Visit: 11/5/2020           After Visit Summary Signature Page    I have received my discharge instructions, and my questions have been answered. I have discussed any challenges I see with this plan with the nurse or doctor.    ..........................................................................................................................................  Patient/Patient Representative Signature      ..........................................................................................................................................  Patient Representative Print Name and Relationship to Patient    ..................................................               ................................................  Date                                   Time    ..........................................................................................................................................  Reviewed by Signature/Title    ...................................................              ..............................................  Date                                               Time          22EPIC Rev 08/18

## 2020-11-05 NOTE — ED PROVIDER NOTES
History     Chief Complaint   Patient presents with     Generalized Weakness     The history is provided by the patient and the nursing home.     Efe Cardona is a 85 year old male who presented to the emergency department via EMS for evaluation of increasing weakness, fatigue, mild nonproductive cough, and poor intake.  Symptoms seem to have been progressing over the last several weeks.  Has recently been started on Rocephin from his primary care provider.  The patient denies any questions or concerns to me whatsoever.  He denies any significant shortness of breath, cough, chest pain, abdominal pain, vomiting, or diarrhea.  There has been no documented fevers.    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 Provider to review and confirm.          Past Medical History:    Past Medical History:   Diagnosis Date     Atheroma 8/14/13     BPH (benign prostatic hyperplasia)      Cerebral embolism with cerebral infarction (H) 7/2013     COPD (chronic obstructive pulmonary disease) (H)      Hypertension        Past Surgical History:    Past Surgical History:   Procedure Laterality Date     ENDOSCOPY UPPER, COLONOSCOPY, COMBINED  2/12/2014    Procedure: COMBINED ENDOSCOPY UPPER, COLONOSCOPY;  UPPER ENDOSCOPY WITH  BIOPSIES  AND COLONOSCOPY WITH POLYPECTOMY;  Surgeon: Reta Gallagher MD;  Location: [facility]     EXCISE CYST THYROGLOSSAL DUCT       IR CAROTID CERVICAL ANGIOGRAM RIGHT  2020     VASECTOMY         Family History:    Family History   Problem Relation Age of Onset     Cancer - colorectal Mother      Cancer Sister 79        unknown primary       Social History:  Marital Status:   [2]  Social History     Tobacco Use     Smoking status: Former Smoker     Packs/day: 0.50     Years: 60.00     Pack years: 30.00     Quit date: 2013     Years since quittin.3     Smokeless tobacco: Former User   Substance Use Topics     Alcohol use: No     Drug use: No        Medications:         acetaminophen (TYLENOL) 325 MG tablet       albuterol (PROVENTIL) (2.5 MG/3ML) 0.083% neb solution       amLODIPine (NORVASC) 10 MG tablet       aspirin (ASA) 81 MG EC tablet       fluticasone-vilanterol (BREO ELLIPTA) 200-25 MCG/INH inhaler       levofloxacin (LEVAQUIN) 250 MG tablet       metoprolol tartrate (LOPRESSOR) 25 MG tablet       mirtazapine (REMERON) 30 MG tablet       multivitamin w/minerals (MULTI-VITAMIN) tablet       BENAZEPRIL HCL PO       clopidogrel (PLAVIX) 75 MG tablet       fluticasone-salmeterol (ADVAIR) 250-50 MCG/DOSE diskus inhaler       pravastatin (PRAVACHOL) 10 MG tablet          Review of Systems   Constitutional: Positive for activity change. Negative for appetite change, chills, fatigue and fever.   Respiratory: Positive for cough. Negative for shortness of breath.    Cardiovascular: Negative for chest pain.   Gastrointestinal: Negative for abdominal pain, diarrhea, nausea and vomiting.   Genitourinary: Negative.    Skin: Negative.    Neurological: Negative for dizziness, weakness, light-headedness and headaches.       Physical Exam   BP: 155/96  Pulse: 76  Temp: 97.3  F (36.3  C)  Resp: 22  SpO2: 95 %      Physical Exam  Vitals signs and nursing note reviewed.   Constitutional:       Appearance: He  is normal weight.      Comments: Chronically ill and frail appearing 85-year-old male found semireclined on the exam bed in no distress.   Cardiovascular:      Rate and Rhythm: Normal rate.   Pulmonary:      Effort: Pulmonary effort is normal.      Breath sounds: Normal breath sounds.   Abdominal:      General: There is no distension.      Palpations: Abdomen is soft.      Tenderness: There is no abdominal tenderness. There is no guarding.   Skin:     General: Skin is warm and dry.      Capillary Refill: Capillary refill takes less than 2 seconds.   Neurological:      General: No focal deficit present.      Mental Status: He is alert and oriented to person, place, and time.   Psychiatric:         Mood and Affect: Mood normal.         ED Course        Procedures               Critical Care time:  none               Results for orders placed or performed during the hospital encounter of 11/05/20 (from the past 24 hour(s))   CBC with platelets differential   Result Value Ref Range    WBC 8.2 4.0 - 11.0 10e9/L    RBC Count 4.49 4.4 - 5.9 10e12/L    Hemoglobin 13.1 (L) 13.3 - 17.7 g/dL    Hematocrit 40.4 40.0 - 53.0 %    MCV 90 78 - 100 fl    MCH 29.2 26.5 - 33.0 pg    MCHC 32.4 31.5 - 36.5 g/dL    RDW 15.7 (H) 10.0 - 15.0 %    Platelet Count 250 150 - 450 10e9/L    Diff Method Automated Method     % Neutrophils 70.1 %    % Lymphocytes 13.8 %    % Monocytes 7.8 %    % Eosinophils 7.3 %    % Basophils 0.4 %    % Immature Granulocytes 0.6 %    Nucleated RBCs 0 0 /100    Absolute Neutrophil 5.8 1.6 - 8.3 10e9/L    Absolute Lymphocytes 1.1 0.8 - 5.3 10e9/L    Absolute Monocytes 0.6 0.0 - 1.3 10e9/L    Absolute Eosinophils 0.6 0.0 - 0.7 10e9/L    Absolute Basophils 0.0 0.0 - 0.2 10e9/L    Abs Immature Granulocytes 0.1 0 - 0.4 10e9/L    Absolute Nucleated RBC 0.0    Comprehensive metabolic panel   Result Value Ref Range    Sodium 143 133 - 144 mmol/L    Potassium 4.2 3.4 - 5.3 mmol/L    Chloride 107 94 - 109 mmol/L    Carbon  Dioxide 30 20 - 32 mmol/L    Anion Gap 6 3 - 14 mmol/L    Glucose 110 (H) 70 - 99 mg/dL    Urea Nitrogen 26 7 - 30 mg/dL    Creatinine 1.51 (H) 0.66 - 1.25 mg/dL    GFR Estimate 42 (L) >60 mL/min/[1.73_m2]    GFR Estimate If Black 48 (L) >60 mL/min/[1.73_m2]    Calcium 8.8 8.5 - 10.1 mg/dL    Bilirubin Total 0.5 0.2 - 1.3 mg/dL    Albumin 2.6 (L) 3.4 - 5.0 g/dL    Protein Total 6.8 6.8 - 8.8 g/dL    Alkaline Phosphatase 72 40 - 150 U/L    ALT 19 0 - 70 U/L    AST 17 0 - 45 U/L   Lactic acid whole blood   Result Value Ref Range    Lactic Acid 1.7 0.7 - 2.0 mmol/L   XR Chest Port 1 View    Narrative    PROCEDURE: XR CHEST PORT 1 VW 11/5/2020 3:21 PM    HISTORY: cough    COMPARISONS: 9/7/2013.    TECHNIQUE: Single view.    FINDINGS: There is a large left perihilar mass. This measures at least  10.8 cm in length.    Heart is not enlarged. Right lung is clear. Pleural spaces are clear.         Impression    IMPRESSION: Large masslike density in the left perihilar region. Lung  carcinoma is the diagnosis of exclusion. Unusual pneumonia is also in  the differential considerations. Recommend CT to evaluate this  further.    GABRIEL THOMPSON MD   CT Chest w Contrast    Narrative    PROCEDURE: CT CHEST W CONTRAST 11/5/2020 4:13 PM    HISTORY: chest mass    COMPARISONS: None.    Meds/Dose Given: Isovue 300, 100ml    TECHNIQUE: CT scan of the chest with IV contrast sagittal and coronal  reconstructions    FINDINGS: There is a 9.3 cm diameter mass centrally in the left lower  lobe with central necrosis. There is an enlarged right hilar lymph  node. Emphysematous changes are noted in both lungs. There is some  linear atelectasis or scarring seen at both lung bases. The  mediastinal lymph nodes are normal in caliber. Axillary and  supraclavicular lymph nodes appear normal the heart is normal in size.  Heavy atherosclerotic plaquing is seen in the thoracic aorta. The  upper portion of the liver and spleen appear normal. There is a  left  thyroid nodule centrally measuring 17 mm in diameter. Generative  changes are present in the thoracic spine         Impression    IMPRESSION: Large left lower lobe mass with central low density most  likely necrosis and enlarged right hilar lymph node is noted.    RADHA ALAS MD       Medications   cefTRIAXone IN D5W (ROCEPHIN) intermittent infusion 2 g (2 g Intravenous New Bag 11/5/20 1700)   0.9% sodium chloride BOLUS (0 mLs Intravenous Stopped 11/5/20 1617)   sodium chloride (PF) 0.9% PF flush 60 mL (60 mLs Intravenous Given 11/5/20 1604)   iopamidol (ISOVUE-300) IV solution 61% 100 mL (100 mLs Intravenous Given 11/5/20 1604)   levofloxacin (LEVAQUIN) tablet 500 mg (500 mg Oral Given 11/5/20 1700)       Assessments & Plan (with Medical Decision Making)   Work-up as above.  Infectious versus malignant source.  We will treat as infectious and follow.  IV Rocephin and started on oral Levaquin renally dosed.  Long detailed discussion with the patient and his wife.  He can be discharged to the nursing home with close follow-up.  They voiced understanding and were agreeable.    This document was prepared using a combination of typing and voice generated software.  While every attempt was made for accuracy, spelling and grammatical errors may exist.    I have reviewed the nursing notes.    I have reviewed the findings, diagnosis, plan and need for follow up with the patient.       New Prescriptions    LEVOFLOXACIN (LEVAQUIN) 250 MG TABLET    Take 1 tablet (250 mg) by mouth daily for 7 days       Final diagnoses:   Mass of lower lobe of lung       11/5/2020   [facility]     Crystal Francisco PA-C  11/05/20 [id number]

## 2020-11-05 NOTE — ED NOTES
Sat recheck 84-85 % on Room air; attempted to have patient cough; sats 86-87 %; oxygen applied 2LPM via NC and sats increased to 92 %; provider updated.  Will attempt to reposition patient.

## 2020-11-06 NOTE — ED NOTES
Patient's IV removed.  Clothing back on.  Patient tolerating well.  sats remain mid 90's on RA.  [facility]line here are this time.  Patient's discharge papers and ED visit summary sent back with healthline.

## (undated) RX ORDER — FENTANYL CITRATE 50 UG/ML
INJECTION, SOLUTION INTRAMUSCULAR; INTRAVENOUS
Status: DISPENSED
Start: 2020-01-01